# Patient Record
Sex: FEMALE | Race: WHITE | NOT HISPANIC OR LATINO
[De-identification: names, ages, dates, MRNs, and addresses within clinical notes are randomized per-mention and may not be internally consistent; named-entity substitution may affect disease eponyms.]

---

## 2017-03-21 ENCOUNTER — APPOINTMENT (OUTPATIENT)
Dept: GASTROENTEROLOGY | Facility: CLINIC | Age: 67
End: 2017-03-21

## 2017-11-13 ENCOUNTER — OUTPATIENT (OUTPATIENT)
Dept: OUTPATIENT SERVICES | Facility: HOSPITAL | Age: 67
LOS: 1 days | End: 2017-11-13
Payer: MEDICARE

## 2017-11-13 VITALS
OXYGEN SATURATION: 99 % | HEART RATE: 80 BPM | SYSTOLIC BLOOD PRESSURE: 130 MMHG | HEIGHT: 60 IN | DIASTOLIC BLOOD PRESSURE: 74 MMHG | TEMPERATURE: 97 F | RESPIRATION RATE: 16 BRPM | WEIGHT: 197.98 LBS

## 2017-11-13 DIAGNOSIS — I10 ESSENTIAL (PRIMARY) HYPERTENSION: ICD-10-CM

## 2017-11-13 DIAGNOSIS — D64.9 ANEMIA, UNSPECIFIED: ICD-10-CM

## 2017-11-13 DIAGNOSIS — Z98.890 OTHER SPECIFIED POSTPROCEDURAL STATES: Chronic | ICD-10-CM

## 2017-11-13 DIAGNOSIS — Z85.820 PERSONAL HISTORY OF MALIGNANT MELANOMA OF SKIN: Chronic | ICD-10-CM

## 2017-11-13 DIAGNOSIS — N81.10 CYSTOCELE, UNSPECIFIED: Chronic | ICD-10-CM

## 2017-11-13 DIAGNOSIS — Z96.653 PRESENCE OF ARTIFICIAL KNEE JOINT, BILATERAL: Chronic | ICD-10-CM

## 2017-11-13 DIAGNOSIS — E78.00 PURE HYPERCHOLESTEROLEMIA, UNSPECIFIED: ICD-10-CM

## 2017-11-13 LAB
BUN SERPL-MCNC: 27 MG/DL — HIGH (ref 7–23)
CALCIUM SERPL-MCNC: 9.6 MG/DL — SIGNIFICANT CHANGE UP (ref 8.4–10.5)
CHLORIDE SERPL-SCNC: 101 MMOL/L — SIGNIFICANT CHANGE UP (ref 98–107)
CO2 SERPL-SCNC: 27 MMOL/L — SIGNIFICANT CHANGE UP (ref 22–31)
CREAT SERPL-MCNC: 0.94 MG/DL — SIGNIFICANT CHANGE UP (ref 0.5–1.3)
GLUCOSE SERPL-MCNC: 88 MG/DL — SIGNIFICANT CHANGE UP (ref 70–99)
HCT VFR BLD CALC: 33.7 % — LOW (ref 34.5–45)
HGB BLD-MCNC: 11.2 G/DL — LOW (ref 11.5–15.5)
MCHC RBC-ENTMCNC: 31.6 PG — SIGNIFICANT CHANGE UP (ref 27–34)
MCHC RBC-ENTMCNC: 33.2 % — SIGNIFICANT CHANGE UP (ref 32–36)
MCV RBC AUTO: 95.2 FL — SIGNIFICANT CHANGE UP (ref 80–100)
NRBC # FLD: 0 — SIGNIFICANT CHANGE UP
PLATELET # BLD AUTO: 245 K/UL — SIGNIFICANT CHANGE UP (ref 150–400)
PMV BLD: 11.1 FL — SIGNIFICANT CHANGE UP (ref 7–13)
POTASSIUM SERPL-MCNC: 4.1 MMOL/L — SIGNIFICANT CHANGE UP (ref 3.5–5.3)
POTASSIUM SERPL-SCNC: 4.1 MMOL/L — SIGNIFICANT CHANGE UP (ref 3.5–5.3)
RBC # BLD: 3.54 M/UL — LOW (ref 3.8–5.2)
RBC # FLD: 12.6 % — SIGNIFICANT CHANGE UP (ref 10.3–14.5)
SODIUM SERPL-SCNC: 141 MMOL/L — SIGNIFICANT CHANGE UP (ref 135–145)
WBC # BLD: 8.37 K/UL — SIGNIFICANT CHANGE UP (ref 3.8–10.5)
WBC # FLD AUTO: 8.37 K/UL — SIGNIFICANT CHANGE UP (ref 3.8–10.5)

## 2017-11-13 PROCEDURE — 93010 ELECTROCARDIOGRAM REPORT: CPT

## 2017-11-13 NOTE — H&P PST ADULT - FAMILY HISTORY
Father  Still living? No  Family history of CHF (congestive heart failure), Age at diagnosis: Age Unknown  Family history of renal disease, Age at diagnosis: Age Unknown     Mother  Still living? Yes, Estimated age:   Family history of rheumatic fever, Age at diagnosis: Age Unknown

## 2017-11-13 NOTE — H&P PST ADULT - PROBLEM SELECTOR PLAN 1
Pt. is scheduled for upper endoscopy, colonoscopy on 11/21/17.  Preoperative instructions reviewed, pt verbalized understanding.    Preop Famotidine provided.  Lab results pending, EKG done

## 2017-11-13 NOTE — H&P PST ADULT - NSANTHOSAYNRD_GEN_A_CORE
No. QUIN screening performed.  STOP BANG Legend: 0-2 = LOW Risk; 3-4 = INTERMEDIATE Risk; 5-8 = HIGH Risk

## 2017-11-13 NOTE — H&P PST ADULT - HISTORY OF PRESENT ILLNESS
[age] y/o female presents to PAST today for presurgical evaluation.  Her last colonoscopy was approximately 5 years ago.  Recent blood work revealed that she is anemic.  She denies any abdominal pain, diarrhea, constipation or change in bowel habits.  She is scheduled for upper endoscopy, colonoscopy on 11/21/17.

## 2017-11-13 NOTE — H&P PST ADULT - PSH
Female bladder prolapse  s/p sling (2014)  H/O total knee replacement, bilateral  5/2013  History of malignant melanoma  left leg, s/p excision (1978)  S/P bunionectomy  right

## 2017-11-21 ENCOUNTER — RESULT REVIEW (OUTPATIENT)
Age: 67
End: 2017-11-21

## 2017-11-21 ENCOUNTER — TRANSCRIPTION ENCOUNTER (OUTPATIENT)
Age: 67
End: 2017-11-21

## 2017-11-21 ENCOUNTER — OUTPATIENT (OUTPATIENT)
Dept: OUTPATIENT SERVICES | Facility: HOSPITAL | Age: 67
LOS: 1 days | Discharge: HOME | End: 2017-11-21
Payer: MEDICARE

## 2017-11-21 DIAGNOSIS — Z98.890 OTHER SPECIFIED POSTPROCEDURAL STATES: Chronic | ICD-10-CM

## 2017-11-21 DIAGNOSIS — Z96.653 PRESENCE OF ARTIFICIAL KNEE JOINT, BILATERAL: Chronic | ICD-10-CM

## 2017-11-21 DIAGNOSIS — Z85.820 PERSONAL HISTORY OF MALIGNANT MELANOMA OF SKIN: Chronic | ICD-10-CM

## 2017-11-21 DIAGNOSIS — D64.9 ANEMIA, UNSPECIFIED: ICD-10-CM

## 2017-11-21 DIAGNOSIS — N81.10 CYSTOCELE, UNSPECIFIED: Chronic | ICD-10-CM

## 2017-11-21 PROCEDURE — 88305 TISSUE EXAM BY PATHOLOGIST: CPT | Mod: 26

## 2017-11-21 PROCEDURE — 88312 SPECIAL STAINS GROUP 1: CPT | Mod: 26

## 2017-11-24 LAB — SURGICAL PATHOLOGY STUDY: SIGNIFICANT CHANGE UP

## 2018-10-31 PROBLEM — D64.9 ANEMIA, UNSPECIFIED: Chronic | Status: ACTIVE | Noted: 2017-11-13

## 2018-10-31 PROBLEM — E03.9 HYPOTHYROIDISM, UNSPECIFIED: Chronic | Status: ACTIVE | Noted: 2017-11-13

## 2018-10-31 PROBLEM — E78.00 PURE HYPERCHOLESTEROLEMIA, UNSPECIFIED: Chronic | Status: ACTIVE | Noted: 2017-11-13

## 2018-10-31 PROBLEM — C43.70 MALIGNANT MELANOMA OF UNSPECIFIED LOWER LIMB, INCLUDING HIP: Chronic | Status: ACTIVE | Noted: 2017-11-13

## 2018-10-31 PROBLEM — I10 ESSENTIAL (PRIMARY) HYPERTENSION: Chronic | Status: ACTIVE | Noted: 2017-11-13

## 2018-11-27 ENCOUNTER — RX RENEWAL (OUTPATIENT)
Age: 68
End: 2018-11-27

## 2019-01-03 ENCOUNTER — APPOINTMENT (OUTPATIENT)
Dept: INTERNAL MEDICINE | Facility: CLINIC | Age: 69
End: 2019-01-03
Payer: MEDICARE

## 2019-01-03 VITALS
DIASTOLIC BLOOD PRESSURE: 86 MMHG | RESPIRATION RATE: 16 BRPM | HEART RATE: 96 BPM | WEIGHT: 190 LBS | SYSTOLIC BLOOD PRESSURE: 133 MMHG | HEIGHT: 61 IN | BODY MASS INDEX: 35.87 KG/M2

## 2019-01-03 DIAGNOSIS — Z00.00 ENCOUNTER FOR GENERAL ADULT MEDICAL EXAMINATION W/OUT ABNORMAL FINDINGS: ICD-10-CM

## 2019-01-03 PROCEDURE — 99214 OFFICE O/P EST MOD 30 MIN: CPT

## 2019-01-03 RX ORDER — HYDROCODONE BITARTRATE AND HOMATROPINE METHYLBROMIDE 5; 1.5 MG/5ML; MG/5ML
5-1.5 SOLUTION ORAL EVERY 4 HOURS
Refills: 0 | Status: DISCONTINUED | COMMUNITY
End: 2019-01-03

## 2019-01-03 NOTE — HISTORY OF PRESENT ILLNESS
[Patient was last seen on ___] : Patient was last seen on [unfilled] [FreeTextEntry6] : Existing patient\par Scheduled appointment\par Chronic problems\par Continuation of care\par Management of issues\par Coordination of therapies\par \par The patient has been having pain in her ankles especially the right.  She has seen foot and ankle surgeon Prabhakar Rivas and is anticipating reconstruction surgery to be done as an outpatient at [place] in early March.\par \par She continues to have pain in her low back and in her right hip which limits her mobility and functionality.\par \par Presurgical testing will be done at [place] and she will be seen here for clearance several days later. [Checks BP Regularly] : The patient checks ~his/her~ blood pressure regularly [de-identified] : Controlled\par On meds\par Adherent\par No side effects [Doing Well] : Patient is doing well [Moderate Intensity Therapy] : Patient is currently on moderate intensity statin  therapy

## 2019-01-03 NOTE — PHYSICAL EXAM

## 2019-01-08 ENCOUNTER — APPOINTMENT (OUTPATIENT)
Dept: INTERNAL MEDICINE | Facility: CLINIC | Age: 69
End: 2019-01-08
Payer: MEDICARE

## 2019-01-08 PROCEDURE — 93306 TTE W/DOPPLER COMPLETE: CPT

## 2019-03-03 ENCOUNTER — TRANSCRIPTION ENCOUNTER (OUTPATIENT)
Age: 69
End: 2019-03-03

## 2019-03-06 ENCOUNTER — APPOINTMENT (OUTPATIENT)
Dept: INTERNAL MEDICINE | Facility: CLINIC | Age: 69
End: 2019-03-06
Payer: MEDICARE

## 2019-03-06 VITALS — HEART RATE: 86 BPM | DIASTOLIC BLOOD PRESSURE: 82 MMHG | SYSTOLIC BLOOD PRESSURE: 125 MMHG

## 2019-03-06 VITALS — WEIGHT: 190 LBS | HEIGHT: 61 IN | BODY MASS INDEX: 35.87 KG/M2

## 2019-03-06 DIAGNOSIS — M54.16 RADICULOPATHY, LUMBAR REGION: ICD-10-CM

## 2019-03-06 DIAGNOSIS — I65.29 OCCLUSION AND STENOSIS OF UNSPECIFIED CAROTID ARTERY: ICD-10-CM

## 2019-03-06 PROCEDURE — 99214 OFFICE O/P EST MOD 30 MIN: CPT

## 2019-03-06 NOTE — RESULTS/DATA
[] : results reviewed [de-identified] : acceptable [de-identified] : acceptable [de-identified] : acceptable [de-identified] : acceptable

## 2019-03-06 NOTE — HISTORY OF PRESENT ILLNESS
[No Pertinent Cardiac History] : no history of aortic stenosis, atrial fibrillation, coronary artery disease, recent myocardial infarction, or implantable device/pacemaker [No Pertinent Pulmonary History] : no history of asthma, COPD, sleep apnea, or smoking [No Adverse Anesthesia Reaction] : no adverse anesthesia reaction in self or family member [(Patient denies any chest pain, claudication, dyspnea on exertion, orthopnea, palpitations or syncope)] : Patient denies any chest pain, claudication, dyspnea on exertion, orthopnea, palpitations or syncope [Chronic Anticoagulation] : no chronic anticoagulation [Chronic Kidney Disease] : no chronic kidney disease [FreeTextEntry1] : triple arthrodesis R foot [FreeTextEntry2] : Tuesday March 12, 2019 [FreeTextEntry3] : Prabhakar Rivas MD   to be done ambulatory at [facility] in [place], [place] [FreeTextEntry7] : EKG  pre op  wnl\par Echo  Jan '19  unremarkable

## 2019-03-06 NOTE — CONSULT LETTER
[Dear  ___] : Dear  [unfilled], [Consult Closing:] : Thank you very much for allowing me to participate in the care of this patient.  If you have any questions, please do not hesitate to contact me. [FreeTextEntry2] : Prabhakar Riavs MD\par [address] [facility]\par [place]

## 2019-03-06 NOTE — ASSESSMENT
[Patient Optimized for Surgery] : Patient optimized for surgery [No Further Testing Recommended] : no further testing recommended [Continue medications as is] : Continue current medications [As per surgery] : as per surgery

## 2019-05-29 ENCOUNTER — RX RENEWAL (OUTPATIENT)
Age: 69
End: 2019-05-29

## 2019-07-18 ENCOUNTER — APPOINTMENT (OUTPATIENT)
Dept: INTERNAL MEDICINE | Facility: CLINIC | Age: 69
End: 2019-07-18
Payer: MEDICARE

## 2019-07-18 VITALS
OXYGEN SATURATION: 98 % | WEIGHT: 190 LBS | RESPIRATION RATE: 16 BRPM | DIASTOLIC BLOOD PRESSURE: 75 MMHG | HEART RATE: 83 BPM | BODY MASS INDEX: 35.87 KG/M2 | HEIGHT: 61 IN | SYSTOLIC BLOOD PRESSURE: 128 MMHG

## 2019-07-18 DIAGNOSIS — R79.89 OTHER SPECIFIED ABNORMAL FINDINGS OF BLOOD CHEMISTRY: ICD-10-CM

## 2019-07-18 DIAGNOSIS — D64.9 ANEMIA, UNSPECIFIED: ICD-10-CM

## 2019-07-18 DIAGNOSIS — H61.301 ACQUIRED STENOSIS OF RIGHT EXTERNAL EAR CANAL, UNSPECIFIED: ICD-10-CM

## 2019-07-18 PROCEDURE — 36415 COLL VENOUS BLD VENIPUNCTURE: CPT

## 2019-07-18 PROCEDURE — 99214 OFFICE O/P EST MOD 30 MIN: CPT | Mod: 25

## 2019-07-18 RX ORDER — MELOXICAM 15 MG/1
15 TABLET ORAL DAILY
Qty: 90 | Refills: 0 | Status: ACTIVE | COMMUNITY
Start: 2019-07-18

## 2019-07-18 NOTE — HISTORY OF PRESENT ILLNESS
[FreeTextEntry1] : This is a followup visit.\par \par 3 months ago.  She had a triple arthrodesis in the right foot done at [facility] without an overnight stay.  She was nonweightbearing for 3 months, but now has returned to walking, better than what he can and bearing weight and was out pain, limitation on anti-inflammatory with meloxicam on a daily basis.\par \par She has been adhering to her other medication.\par \par She has no side effects from the medication that she is taking.\par She has been getting physical therapy regularly.\par She has returned to nearly all her activities of daily living.

## 2019-07-19 LAB
ALBUMIN SERPL ELPH-MCNC: 4.7 G/DL
ALP BLD-CCNC: 98 U/L
ALT SERPL-CCNC: 28 U/L
ANION GAP SERPL CALC-SCNC: 11 MMOL/L
AST SERPL-CCNC: 31 U/L
BASOPHILS # BLD AUTO: 0.04 K/UL
BASOPHILS NFR BLD AUTO: 0.6 %
BILIRUB SERPL-MCNC: 0.4 MG/DL
BUN SERPL-MCNC: 32 MG/DL
CALCIUM SERPL-MCNC: 9.5 MG/DL
CHLORIDE SERPL-SCNC: 100 MMOL/L
CHOLEST SERPL-MCNC: 203 MG/DL
CHOLEST/HDLC SERPL: 3.3 RATIO
CK SERPL-CCNC: 288 U/L
CO2 SERPL-SCNC: 27 MMOL/L
CREAT SERPL-MCNC: 0.95 MG/DL
EOSINOPHIL # BLD AUTO: 0.21 K/UL
EOSINOPHIL NFR BLD AUTO: 3.1 %
ERYTHROCYTE [SEDIMENTATION RATE] IN BLOOD BY WESTERGREN METHOD: 9 MM/HR
GGT SERPL-CCNC: 16 U/L
GLUCOSE SERPL-MCNC: 100 MG/DL
HCT VFR BLD CALC: 36.9 %
HDLC SERPL-MCNC: 62 MG/DL
HGB BLD-MCNC: 12.1 G/DL
IMM GRANULOCYTES NFR BLD AUTO: 0.3 %
LDLC SERPL CALC-MCNC: 110 MG/DL
LYMPHOCYTES # BLD AUTO: 1.48 K/UL
LYMPHOCYTES NFR BLD AUTO: 21.9 %
MAN DIFF?: NORMAL
MCHC RBC-ENTMCNC: 30.9 PG
MCHC RBC-ENTMCNC: 32.8 GM/DL
MCV RBC AUTO: 94.4 FL
MONOCYTES # BLD AUTO: 0.62 K/UL
MONOCYTES NFR BLD AUTO: 9.2 %
NEUTROPHILS # BLD AUTO: 4.39 K/UL
NEUTROPHILS NFR BLD AUTO: 64.9 %
PLATELET # BLD AUTO: 224 K/UL
POTASSIUM SERPL-SCNC: 4.8 MMOL/L
PROT SERPL-MCNC: 7.3 G/DL
RBC # BLD: 3.91 M/UL
RBC # FLD: 13.7 %
SODIUM SERPL-SCNC: 138 MMOL/L
T4 FREE SERPL-MCNC: 1.8 NG/DL
TRIGL SERPL-MCNC: 157 MG/DL
TSH SERPL-ACNC: 0.08 UIU/ML
WBC # FLD AUTO: 6.76 K/UL

## 2019-09-01 ENCOUNTER — RX RENEWAL (OUTPATIENT)
Age: 69
End: 2019-09-01

## 2019-09-24 ENCOUNTER — APPOINTMENT (OUTPATIENT)
Dept: INTERNAL MEDICINE | Facility: CLINIC | Age: 69
End: 2019-09-24
Payer: MEDICARE

## 2019-09-24 VITALS
SYSTOLIC BLOOD PRESSURE: 128 MMHG | HEART RATE: 80 BPM | BODY MASS INDEX: 35.87 KG/M2 | WEIGHT: 190 LBS | DIASTOLIC BLOOD PRESSURE: 75 MMHG | HEIGHT: 61 IN

## 2019-09-24 DIAGNOSIS — M25.559 PAIN IN UNSPECIFIED HIP: ICD-10-CM

## 2019-09-24 DIAGNOSIS — Z87.891 PERSONAL HISTORY OF NICOTINE DEPENDENCE: ICD-10-CM

## 2019-09-24 DIAGNOSIS — Z86.19 PERSONAL HISTORY OF OTHER INFECTIOUS AND PARASITIC DISEASES: ICD-10-CM

## 2019-09-24 DIAGNOSIS — Z85.820 PERSONAL HISTORY OF MALIGNANT MELANOMA OF SKIN: ICD-10-CM

## 2019-09-24 DIAGNOSIS — Z87.898 PERSONAL HISTORY OF OTHER SPECIFIED CONDITIONS: ICD-10-CM

## 2019-09-24 DIAGNOSIS — M48.061 SPINAL STENOSIS, LUMBAR REGION WITHOUT NEUROGENIC CLAUDICATION: ICD-10-CM

## 2019-09-24 DIAGNOSIS — J06.9 ACUTE UPPER RESPIRATORY INFECTION, UNSPECIFIED: ICD-10-CM

## 2019-09-24 DIAGNOSIS — Z78.9 OTHER SPECIFIED HEALTH STATUS: ICD-10-CM

## 2019-09-24 PROCEDURE — 99214 OFFICE O/P EST MOD 30 MIN: CPT | Mod: 25

## 2019-09-24 PROCEDURE — 90662 IIV NO PRSV INCREASED AG IM: CPT

## 2019-09-24 PROCEDURE — G0008: CPT

## 2019-09-24 NOTE — HISTORY OF PRESENT ILLNESS
[No Pertinent Cardiac History] : no history of aortic stenosis, atrial fibrillation, coronary artery disease, recent myocardial infarction, or implantable device/pacemaker [No Pertinent Pulmonary History] : no history of asthma, COPD, sleep apnea, or smoking [No Adverse Anesthesia Reaction] : no adverse anesthesia reaction in self or family member [(Patient denies any chest pain, claudication, dyspnea on exertion, orthopnea, palpitations or syncope)] : Patient denies any chest pain, claudication, dyspnea on exertion, orthopnea, palpitations or syncope [Aortic Stenosis] : no aortic stenosis [Atrial Fibrillation] : no atrial fibrillation [Recent Myocardial Infarction] : no recent myocardial infarction [Coronary Artery Disease] : no coronary artery disease [Implantable Device/Pacemaker] : no implantable device/pacemaker [Asthma] : no asthma [COPD] : no COPD [Sleep Apnea] : no sleep apnea [Smoker] : not a smoker [Family Member] : no family member with adverse anesthesia reaction/sudden death [Self] : no previous adverse anesthesia reaction [Diabetes] : no diabetes [Chronic Anticoagulation] : no chronic anticoagulation [Chronic Kidney Disease] : no chronic kidney disease [FreeTextEntry1] : triple arthrodesis left ankle and bunionectomy left foot [FreeTextEntry2] : Tuesday October 1, 2019 to be done ambulatory at [facility] in [place], [place] [FreeTextEntry3] : Prabhakar Rivas MD [FreeTextEntry4] : Had R ankle / door surgery in March 2019 and did well

## 2019-09-24 NOTE — ASSESSMENT
[No Further Testing Recommended] : no further testing recommended [Patient Optimized for Surgery] : Patient optimized for surgery [Continue medications as is] : Continue current medications [As per surgery] : as per surgery

## 2019-09-24 NOTE — RESULTS/DATA
[] : results reviewed [de-identified] : acceptable [de-identified] : acceptable [de-identified] : acceptable [de-identified] : acceptable

## 2019-09-24 NOTE — PHYSICAL EXAM
[No Acute Distress] : no acute distress [Well Nourished] : well nourished [Well Developed] : well developed [Well-Appearing] : well-appearing [PERRL] : pupils equal round and reactive to light [Normal Sclera/Conjunctiva] : normal sclera/conjunctiva [EOMI] : extraocular movements intact [Normal Outer Ear/Nose] : the outer ears and nose were normal in appearance [No JVD] : no jugular venous distention [Normal Oropharynx] : the oropharynx was normal [No Lymphadenopathy] : no lymphadenopathy [Supple] : supple [No Respiratory Distress] : no respiratory distress  [No Accessory Muscle Use] : no accessory muscle use [Thyroid Normal, No Nodules] : the thyroid was normal and there were no nodules present [Clear to Auscultation] : lungs were clear to auscultation bilaterally [Normal Rate] : normal rate  [Regular Rhythm] : with a regular rhythm [Normal S1, S2] : normal S1 and S2 [No Carotid Bruits] : no carotid bruits [No Murmur] : no murmur heard [No Abdominal Bruit] : a ~M bruit was not heard ~T in the abdomen [No Varicosities] : no varicosities [No Edema] : there was no peripheral edema [Pedal Pulses Present] : the pedal pulses are present [No Extremity Clubbing/Cyanosis] : no extremity clubbing/cyanosis [No Palpable Aorta] : no palpable aorta [Soft] : abdomen soft [Non Tender] : non-tender [Non-distended] : non-distended [No Masses] : no abdominal mass palpated [No HSM] : no HSM [Normal Bowel Sounds] : normal bowel sounds [Normal Posterior Cervical Nodes] : no posterior cervical lymphadenopathy [Normal Anterior Cervical Nodes] : no anterior cervical lymphadenopathy [No CVA Tenderness] : no CVA  tenderness [No Spinal Tenderness] : no spinal tenderness [No Joint Swelling] : no joint swelling [Grossly Normal Strength/Tone] : grossly normal strength/tone [No Rash] : no rash [Coordination Grossly Intact] : coordination grossly intact [No Focal Deficits] : no focal deficits [Normal Gait] : normal gait [Deep Tendon Reflexes (DTR)] : deep tendon reflexes were 2+ and symmetric [Normal Insight/Judgement] : insight and judgment were intact [Normal Affect] : the affect was normal

## 2019-09-24 NOTE — CONSULT LETTER
[Dear  ___] : Dear  [unfilled], [Please see my note below.] : Please see my note below. [Consult Letter:] : I had the pleasure of evaluating your patient, [unfilled]. [Consult Closing:] : Thank you very much for allowing me to participate in the care of this patient.  If you have any questions, please do not hesitate to contact me. [FreeTextEntry2] : Prabhakar Rivas MD\par [address] [facility]\par [place], [place]

## 2019-09-27 ENCOUNTER — RX RENEWAL (OUTPATIENT)
Age: 69
End: 2019-09-27

## 2019-09-27 RX ORDER — ROSUVASTATIN CALCIUM 20 MG/1
20 TABLET, FILM COATED ORAL DAILY
Qty: 90 | Refills: 3 | Status: ACTIVE | COMMUNITY
Start: 2019-09-27 | End: 1900-01-01

## 2019-10-07 ENCOUNTER — APPOINTMENT (OUTPATIENT)
Dept: INTERNAL MEDICINE | Facility: CLINIC | Age: 69
End: 2019-10-07

## 2019-10-14 ENCOUNTER — APPOINTMENT (OUTPATIENT)
Dept: INTERNAL MEDICINE | Facility: CLINIC | Age: 69
End: 2019-10-14

## 2019-12-23 ENCOUNTER — RX RENEWAL (OUTPATIENT)
Age: 69
End: 2019-12-23

## 2019-12-23 RX ORDER — IRBESARTAN AND HYDROCHLOROTHIAZIDE 150; 12.5 MG/1; MG/1
150-12.5 TABLET ORAL DAILY
Qty: 90 | Refills: 3 | Status: ACTIVE | COMMUNITY
Start: 2019-12-23 | End: 1900-01-01

## 2020-01-06 ENCOUNTER — RECORD ABSTRACTING (OUTPATIENT)
Age: 70
End: 2020-01-06

## 2020-01-06 DIAGNOSIS — Z92.89 PERSONAL HISTORY OF OTHER MEDICAL TREATMENT: ICD-10-CM

## 2020-01-07 ENCOUNTER — APPOINTMENT (OUTPATIENT)
Dept: INTERNAL MEDICINE | Facility: CLINIC | Age: 70
End: 2020-01-07
Payer: MEDICARE

## 2020-01-07 VITALS
DIASTOLIC BLOOD PRESSURE: 83 MMHG | SYSTOLIC BLOOD PRESSURE: 126 MMHG | OXYGEN SATURATION: 98 % | WEIGHT: 206 LBS | HEART RATE: 72 BPM | BODY MASS INDEX: 38.89 KG/M2 | RESPIRATION RATE: 16 BRPM | HEIGHT: 61 IN

## 2020-01-07 DIAGNOSIS — E03.9 HYPOTHYROIDISM, UNSPECIFIED: ICD-10-CM

## 2020-01-07 DIAGNOSIS — I10 ESSENTIAL (PRIMARY) HYPERTENSION: ICD-10-CM

## 2020-01-07 DIAGNOSIS — E78.00 PURE HYPERCHOLESTEROLEMIA, UNSPECIFIED: ICD-10-CM

## 2020-01-07 PROCEDURE — 99213 OFFICE O/P EST LOW 20 MIN: CPT

## 2020-01-07 RX ORDER — HYDROCODONE BITARTRATE AND HOMATROPINE METHYLBROMIDE 5; 1.5 MG/5ML; MG/5ML
5-1.5 SOLUTION ORAL
Refills: 0 | Status: DISCONTINUED | COMMUNITY
End: 2020-01-07

## 2020-01-07 NOTE — PHYSICAL EXAM
[No Acute Distress] : no acute distress [Well-Appearing] : well-appearing [Well Developed] : well developed [Well Nourished] : well nourished [Normal Sclera/Conjunctiva] : normal sclera/conjunctiva [PERRL] : pupils equal round and reactive to light [EOMI] : extraocular movements intact [Normal Outer Ear/Nose] : the outer ears and nose were normal in appearance [No JVD] : no jugular venous distention [Normal Oropharynx] : the oropharynx was normal [Supple] : supple [No Lymphadenopathy] : no lymphadenopathy [Thyroid Normal, No Nodules] : the thyroid was normal and there were no nodules present [No Accessory Muscle Use] : no accessory muscle use [No Respiratory Distress] : no respiratory distress  [Normal Rate] : normal rate  [Clear to Auscultation] : lungs were clear to auscultation bilaterally [Normal S1, S2] : normal S1 and S2 [Regular Rhythm] : with a regular rhythm [No Murmur] : no murmur heard [No Carotid Bruits] : no carotid bruits [No Abdominal Bruit] : a ~M bruit was not heard ~T in the abdomen [Pedal Pulses Present] : the pedal pulses are present [No Varicosities] : no varicosities [No Edema] : there was no peripheral edema [No Palpable Aorta] : no palpable aorta [Non Tender] : non-tender [Soft] : abdomen soft [No Extremity Clubbing/Cyanosis] : no extremity clubbing/cyanosis [Non-distended] : non-distended [No Masses] : no abdominal mass palpated [No HSM] : no HSM [Normal Bowel Sounds] : normal bowel sounds [Normal Posterior Cervical Nodes] : no posterior cervical lymphadenopathy [Normal Anterior Cervical Nodes] : no anterior cervical lymphadenopathy [No CVA Tenderness] : no CVA  tenderness [No Spinal Tenderness] : no spinal tenderness [No Joint Swelling] : no joint swelling [Grossly Normal Strength/Tone] : grossly normal strength/tone [No Rash] : no rash [Coordination Grossly Intact] : coordination grossly intact [No Focal Deficits] : no focal deficits [Normal Gait] : normal gait [Deep Tendon Reflexes (DTR)] : deep tendon reflexes were 2+ and symmetric [Normal Insight/Judgement] : insight and judgment were intact [Normal Affect] : the affect was normal

## 2020-01-07 NOTE — HISTORY OF PRESENT ILLNESS
[FreeTextEntry1] : Surg done 3 m ago\par [facility]\par [facility]\par Triple arthrodesis / bunionectomy\par Healing\par To start PT when open wound closes\par \par Modest pain\par Can function pretty well\par \par \par To see Arin in F/U  ("leakage")  [de-identified] : Adherent to meds\par No apparent side effects\par

## 2020-03-24 ENCOUNTER — APPOINTMENT (OUTPATIENT)
Dept: INTERNAL MEDICINE | Facility: CLINIC | Age: 70
End: 2020-03-24

## 2020-06-05 ENCOUNTER — RX RENEWAL (OUTPATIENT)
Age: 70
End: 2020-06-05

## 2020-11-02 RX ORDER — FLUTICASONE PROPIONATE 50 UG/1
50 SPRAY, METERED NASAL
Qty: 16 | Refills: 3 | Status: ACTIVE | COMMUNITY
Start: 2019-05-29 | End: 1900-01-01

## 2020-11-30 ENCOUNTER — RX RENEWAL (OUTPATIENT)
Age: 70
End: 2020-11-30

## 2022-06-01 ENCOUNTER — APPOINTMENT (OUTPATIENT)
Dept: ORTHOPEDIC SURGERY | Facility: CLINIC | Age: 72
End: 2022-06-01
Payer: MEDICARE

## 2022-06-01 VITALS — WEIGHT: 180 LBS | HEIGHT: 61 IN | BODY MASS INDEX: 33.99 KG/M2

## 2022-06-01 DIAGNOSIS — M16.11 UNILATERAL PRIMARY OSTEOARTHRITIS, RIGHT HIP: ICD-10-CM

## 2022-06-01 PROCEDURE — 73502 X-RAY EXAM HIP UNI 2-3 VIEWS: CPT

## 2022-06-01 PROCEDURE — 72170 X-RAY EXAM OF PELVIS: CPT | Mod: RT,59

## 2022-06-01 PROCEDURE — 99204 OFFICE O/P NEW MOD 45 MIN: CPT

## 2022-06-01 NOTE — PHYSICAL EXAM
[de-identified] : Constitutional:Well nourished , well developed and in no acute distress\par Psychiatric: Alert and oriented to time place and person.Appropriate affect \par Skin:Head, neck, arms and lower extremities:no lesions or discoloration\par HEENT: Normocephalic, EOM intact, Nasal septum midline,\par Respiratory: Unlabored respirations,no audible wheezing ,no tachypnea, no cyanosis\par Cardiovascular: no leg swelling  no ankle edema no JVD, pulse regular\par Vascular: no calf or thigh tenderness, \par Peripheral pulses; intact\par Lymphatics:No groin adenopathy,no lymphedema lower  or upper extremities\par Right hip logroll provokes typical moderate to marked right hip pain neurovascular intact [de-identified] : X-ray AP pelvis right hip AP lateral reveals narrowing Right superolateral joint space With bone-on-bone contact.  There is A large subchondral acetabular cyst

## 2022-06-01 NOTE — HISTORY OF PRESENT ILLNESS
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  Patient complains of spontaneous onset of chronic pain right hip pain in buttock radiating into the thigh and knee.  Has become constant aggravated by ambulation.  Patient has not responded to acupuncture treatment.  We experience aggravation of symptoms following physical therapy.  Has undergone steroid injection right hip and April 3, 2022 without symptom improvement.

## 2022-06-10 ENCOUNTER — APPOINTMENT (OUTPATIENT)
Dept: ORTHOPEDIC SURGERY | Facility: CLINIC | Age: 72
End: 2022-06-10

## 2022-06-21 ENCOUNTER — APPOINTMENT (OUTPATIENT)
Dept: ORTHOPEDIC SURGERY | Facility: CLINIC | Age: 72
End: 2022-06-21

## 2023-05-15 NOTE — H&P PST ADULT - DOES PATIENT MEET CRITERIA FOR SEPSIS
Acitretin Counseling:  I discussed with the patient the risks of acitretin including but not limited to hair loss, dry lips/skin/eyes, liver damage, hyperlipidemia, depression/suicidal ideation, photosensitivity.  Serious rare side effects can include but are not limited to pancreatitis, pseudotumor cerebri, bony changes, clot formation/stroke/heart attack.  Patient understands that alcohol is contraindicated since it can result in liver toxicity and significantly prolong the elimination of the drug by many years. No

## 2024-05-15 NOTE — HISTORY OF PRESENT ILLNESS
[FreeTextEntry1] : YOBANI is a 73 year female who presents for  She was referred by         Daytime frequency: Nocturia: Urinary urgency: Leakage of urine with urgency: Leakage of urine with coughing sneezing laughing: Incontinence pad use: Sensation of incomplete bladder emptying: History of frequent urinary tract infections: History of hematuria: Previous treatment: Vaginal symptoms: Bowel symptoms:         OB: GYN: LMP, pap, estrogen use PMH: PSH: Meds: Allx:

## 2024-05-15 NOTE — ADDENDUM
[FreeTextEntry1] : This note was written by Amalia Mcwilliams, acting as the  for Dr. Villa. This note accurately reflects the work and decisions made by Dr. Villa.

## 2024-05-15 NOTE — DISCUSSION/SUMMARY
[FreeTextEntry1] : YOBANI is a 73 year female who presents for On exam, negative CST, normal PVR, no POP.   [] []   f/u All questions answered.

## 2024-05-21 ENCOUNTER — APPOINTMENT (OUTPATIENT)
Dept: UROGYNECOLOGY | Facility: CLINIC | Age: 74
End: 2024-05-21

## 2024-07-22 ENCOUNTER — APPOINTMENT (OUTPATIENT)
Dept: UROLOGY | Facility: CLINIC | Age: 74
End: 2024-07-22
Payer: MEDICARE

## 2024-07-22 VITALS
RESPIRATION RATE: 17 BRPM | DIASTOLIC BLOOD PRESSURE: 86 MMHG | HEART RATE: 78 BPM | BODY MASS INDEX: 33.99 KG/M2 | HEIGHT: 61 IN | SYSTOLIC BLOOD PRESSURE: 142 MMHG | WEIGHT: 180 LBS

## 2024-07-22 DIAGNOSIS — R33.9 RETENTION OF URINE, UNSPECIFIED: ICD-10-CM

## 2024-07-22 DIAGNOSIS — N81.10 CYSTOCELE, UNSPECIFIED: ICD-10-CM

## 2024-07-22 DIAGNOSIS — N39.3 STRESS INCONTINENCE (FEMALE) (MALE): ICD-10-CM

## 2024-07-22 DIAGNOSIS — Z84.1 FAMILY HISTORY OF DISORDERS OF KIDNEY AND URETER: ICD-10-CM

## 2024-07-22 DIAGNOSIS — N39.41 URGE INCONTINENCE: ICD-10-CM

## 2024-07-22 DIAGNOSIS — N32.3 DIVERTICULUM OF BLADDER: ICD-10-CM

## 2024-07-22 DIAGNOSIS — N39.490 STRESS INCONTINENCE (FEMALE) (MALE): ICD-10-CM

## 2024-07-22 DIAGNOSIS — Z63.4 DISAPPEARANCE AND DEATH OF FAMILY MEMBER: ICD-10-CM

## 2024-07-22 PROCEDURE — 99204 OFFICE O/P NEW MOD 45 MIN: CPT

## 2024-07-22 RX ORDER — LEVOTHYROXINE SODIUM 137 UG/1
TABLET ORAL
Refills: 0 | Status: ACTIVE | COMMUNITY

## 2024-07-22 SDOH — SOCIAL STABILITY - SOCIAL INSECURITY: DISSAPEARANCE AND DEATH OF FAMILY MEMBER: Z63.4

## 2024-07-22 NOTE — PHYSICAL EXAM
[Normal Appearance] : normal appearance [Well Groomed] : well groomed [General Appearance - In No Acute Distress] : no acute distress [Edema] : no peripheral edema [] : no respiratory distress [Respiration, Rhythm And Depth] : normal respiratory rhythm and effort [Exaggerated Use Of Accessory Muscles For Inspiration] : no accessory muscle use [Costovertebral Angle Tenderness] : no ~M costovertebral angle tenderness [Normal Station and Gait] : the gait and station were normal for the patient's age [Skin Color & Pigmentation] : normal skin color and pigmentation [Oriented To Time, Place, And Person] : oriented to person, place, and time [Affect] : the affect was normal [Mood] : the mood was normal

## 2024-07-22 NOTE — REVIEW OF SYSTEMS
[Negative] : Heme/Lymph [Loss of interest] : loss of interest in sexual activity [Seen by urologist before (Name)  ___] : Previously seen by a urologist: [unfilled] [Urine retention] : urine retention [Wake up at night to urinate  How many times?  ___] : wakes up to urinate [unfilled] times during the night [Strong urge to urinate] : strong urge to urinate [Bladder pressure] : experiences bladder pressure [Wait a long time to urinate] : waits a long time to urinate [Slow urine stream] : slow urine stream [Interrupted urine stream] : interrupted urine stream [Bladder fullness after urinating] : bladder fullness after urinating [Joint Pain] : joint pain [Joint Swelling] : joint swelling [Limb Weakness] : limb weakness [Muscle Weakness] : muscle weakness

## 2024-07-24 ENCOUNTER — NON-APPOINTMENT (OUTPATIENT)
Age: 74
End: 2024-07-24

## 2024-07-24 NOTE — LETTER BODY
[Dear  ___] : Dear  [unfilled], [Consult Letter:] : I had the pleasure of evaluating your patient, [unfilled]. [Please see my note below.] : Please see my note below. [Consult Closing:] : Thank you very much for allowing me to participate in the care of this patient.  If you have any questions, please do not hesitate to contact me. [FreeTextEntry1] : Please see my note. I will keep you informed. [FreeTextEntry3] : Sincerely,  Henna Morales MD Clinical  [facility]

## 2024-07-24 NOTE — ASSESSMENT
[FreeTextEntry1] : 73 y/o F with urinary retention. Hx of AP repair 7/11/2024 and vaginal sling 2010. Currently performing CIC.   Patient advised to catheterize every 4 hours while she is awake and as soon as she wakes up. She should not exceed 450 ml. She states she has been reaching 750-800. I explained that I do not want her bladder to get overdistended as she will have a lower chance to regain bladder contractility.   I am sending the patient for a renal and bladder US to ensure there is no hydronephrosis from retention. She will check if her prior urologist has any recent renal US's.   I will send a note to Dr.Stephanie Barry requesting that she review the patient's hx and MRI report. In the meantime, I will arrange for video UDS and cystoscopy. I advised her to keep a log of how much she takes out when catheterizing the 3 days prior to UDS.

## 2024-07-24 NOTE — ASSESSMENT
[FreeTextEntry1] : 75 y/o F with urinary retention. Hx of AP repair 7/11/2024 and vaginal sling 2010. Currently performing CIC.   Patient advised to catheterize every 4 hours while she is awake and as soon as she wakes up. She should not exceed 450 ml. She states she has been reaching 750-800. I explained that I do not want her bladder to get overdistended as she will have a lower chance to regain bladder contractility.   I am sending the patient for a renal and bladder US to ensure there is no hydronephrosis from retention. She will check if her prior urologist has any recent renal US's.   I will send a note to Dr.Stephanie Barry requesting that she review the patient's hx and MRI report. In the meantime, I will arrange for video UDS and cystoscopy. I advised her to keep a log of how much she takes out when catheterizing the 3 days prior to UDS.

## 2024-07-24 NOTE — ADDENDUM
[FreeTextEntry1] : This note was authored by Melanie Burton working as a scribe for Dr.Tricia Morales. I, Dr.Tricia Morales have reviewed the content of this note and confirm it is true and accurate. I personally performed the history and physical examination and made all the decisions 07/22/2024

## 2024-07-24 NOTE — HISTORY OF PRESENT ILLNESS
[FreeTextEntry1] : Ms.Eileen Soler is a 75 y/o F. Had an AP repair 7/11/2024. Hx of vaginal sling 2010. Patient currently c/o retention and "obstructed urethra". Performing CIC since 7/15 after failing fill and pull voiding trial. In 2010, the sling helped with stress incontinence. Developed overactive bladder and treated with vesicare which worked very well for all urologic complaints. Did not work anymore in 2022. Has since failed myrbetriq, gemtesa, and ditropan. In Jan 2024, tried botox. Developed UTI immediately. In April tried botox again which did not help at all. Denies any gross hematuria or foul smelling urine. Prior to most recent surgery, she was not urinating well. Had a dripping urine stream for the last few years. Has not had UDS since 2010. No hx of recurrent UTIs. Has never been hospitalized for urinary retention or UTI.   She states that this morning she was waiting for the bathroom while her son was in there and could not hold her urine. She had the sensation that her bladder was full.   Patient's orthopedist had ordered an MRI of both hips 4/30/2024 which revealed some urological findings including severely distended bladder with diverticula, and possible impingement of urethra due to synovial cyst arising for pubic symphysis.   Pt is not diabetic. Is on ozempic for weight loss.   Takes gabapentin for neuropathy. Triple arthrodesis in both feet and developed neuropathy post op.   Denies smoking.

## 2024-07-24 NOTE — HISTORY OF PRESENT ILLNESS
[FreeTextEntry1] : Ms.Eileen Soler is a 73 y/o F. Had an AP repair 7/11/2024. Hx of vaginal sling 2010. Patient currently c/o retention and "obstructed urethra". Performing CIC since 7/15 after failing fill and pull voiding trial. In 2010, the sling helped with stress incontinence. Developed overactive bladder and treated with vesicare which worked very well for all urologic complaints. Did not work anymore in 2022. Has since failed myrbetriq, gemtesa, and ditropan. In Jan 2024, tried botox. Developed UTI immediately. In April tried botox again which did not help at all. Denies any gross hematuria or foul smelling urine. Prior to most recent surgery, she was not urinating well. Had a dripping urine stream for the last few years. Has not had UDS since 2010. No hx of recurrent UTIs. Has never been hospitalized for urinary retention or UTI.   She states that this morning she was waiting for the bathroom while her son was in there and could not hold her urine. She had the sensation that her bladder was full.   Patient's orthopedist had ordered an MRI of both hips 4/30/2024 which revealed some urological findings including severely distended bladder with diverticula, and possible impingement of urethra due to synovial cyst arising for pubic symphysis.   Pt is not diabetic. Is on ozempic for weight loss.   Takes gabapentin for neuropathy. Triple arthrodesis in both feet and developed neuropathy post op.   Denies smoking.

## 2024-07-24 NOTE — LETTER BODY
[Dear  ___] : Dear  [unfilled], [Consult Letter:] : I had the pleasure of evaluating your patient, [unfilled]. [Please see my note below.] : Please see my note below. [Consult Closing:] : Thank you very much for allowing me to participate in the care of this patient.  If you have any questions, please do not hesitate to contact me. [FreeTextEntry1] : Please see my note. I will keep you informed. [FreeTextEntry3] : Sincerely,  Henna Morales MD Clinical  [facility]

## 2024-07-31 ENCOUNTER — APPOINTMENT (OUTPATIENT)
Dept: UROLOGY | Facility: CLINIC | Age: 74
End: 2024-07-31

## 2024-07-31 ENCOUNTER — APPOINTMENT (OUTPATIENT)
Dept: UROLOGY | Facility: CLINIC | Age: 74
End: 2024-07-31
Payer: MEDICARE

## 2024-07-31 ENCOUNTER — OUTPATIENT (OUTPATIENT)
Dept: OUTPATIENT SERVICES | Facility: HOSPITAL | Age: 74
LOS: 1 days | End: 2024-07-31

## 2024-07-31 VITALS
SYSTOLIC BLOOD PRESSURE: 112 MMHG | DIASTOLIC BLOOD PRESSURE: 69 MMHG | RESPIRATION RATE: 16 BRPM | HEART RATE: 89 BPM | OXYGEN SATURATION: 99 % | TEMPERATURE: 97.5 F

## 2024-07-31 DIAGNOSIS — Z96.653 PRESENCE OF ARTIFICIAL KNEE JOINT, BILATERAL: Chronic | ICD-10-CM

## 2024-07-31 DIAGNOSIS — A49.9 URINARY TRACT INFECTION, SITE NOT SPECIFIED: ICD-10-CM

## 2024-07-31 DIAGNOSIS — R35.0 FREQUENCY OF MICTURITION: ICD-10-CM

## 2024-07-31 DIAGNOSIS — N32.0 BLADDER-NECK OBSTRUCTION: ICD-10-CM

## 2024-07-31 DIAGNOSIS — N39.0 URINARY TRACT INFECTION, SITE NOT SPECIFIED: ICD-10-CM

## 2024-07-31 DIAGNOSIS — N81.10 CYSTOCELE, UNSPECIFIED: Chronic | ICD-10-CM

## 2024-07-31 DIAGNOSIS — Z98.890 OTHER SPECIFIED POSTPROCEDURAL STATES: Chronic | ICD-10-CM

## 2024-07-31 PROCEDURE — 99214 OFFICE O/P EST MOD 30 MIN: CPT

## 2024-07-31 RX ORDER — LEVOFLOXACIN 750 MG/1
750 TABLET, FILM COATED ORAL DAILY
Qty: 5 | Refills: 0 | Status: ACTIVE | COMMUNITY
Start: 2024-07-31 | End: 1900-01-01

## 2024-07-31 RX ORDER — CIPROFLOXACIN HYDROCHLORIDE 500 MG/1
500 TABLET, FILM COATED ORAL
Qty: 10 | Refills: 0 | Status: DISCONTINUED | COMMUNITY
Start: 2024-07-31 | End: 2024-07-31

## 2024-07-31 NOTE — REVIEW OF SYSTEMS
[Dysuria] : dysuria [Urine Infection (bladder/kidney)] : bladder/kidney infection [Urine retention] : urine retention [Negative] : Heme/Lymph

## 2024-08-04 NOTE — LETTER BODY
[Dear  ___] : Dear  [unfilled], [Consult Letter:] : I had the pleasure of evaluating your patient, [unfilled]. [Please see my note below.] : Please see my note below. [Consult Closing:] : Thank you very much for allowing me to participate in the care of this patient.  If you have any questions, please do not hesitate to contact me. [FreeTextEntry1] : Please see my note. I will keep you informed. [FreeTextEntry3] : Sincerely,  Henna Morales MD Clinical  [facility]

## 2024-08-04 NOTE — LETTER BODY
[Dear  ___] : Dear  [unfilled], [Consult Letter:] : I had the pleasure of evaluating your patient, [unfilled]. [Please see my note below.] : Please see my note below. [FreeTextEntry1] : Please see my note. I will keep you informed. [Consult Closing:] : Thank you very much for allowing me to participate in the care of this patient.  If you have any questions, please do not hesitate to contact me. [FreeTextEntry3] : Sincerely,  Henna Morales MD Clinical  [facility]

## 2024-08-04 NOTE — ADDENDUM
[FreeTextEntry1] : This note was partly authored by Micky Lentz working as a scribe for GABRIELA Mitchell. I, GABRIELA Mitchell, have reviewed the content of this note and confirm it is true and accurate. I personally performed the history and physical examination and made all the decisions. 07/31/2024.

## 2024-08-04 NOTE — ASSESSMENT
[FreeTextEntry1] : 7/24/24 KEVIN 1) Echogenic masses within right lobe of the liver suspicious for hemangiomas 2) Left renal cysts.  Patient will speak with PCP about echogenic mass seen on right hepatic lobe.   Patient advised to keep catheterize every 4 hours while she is awake and as soon as she wakes up. She should not exceed 450 ml. Latest voiding diary: Volume catheterized all between 100 to 350. with 350 being the highest 11 voids and 3 at night yesterday when the symptoms started.  5 voids before coming to office today.   She will start Levofloxacin 750 mg one tablet daily until finished  Patient will have UDS and Cystoscopy and we will reschedule.  Also, have reached out to Dr. Barry's team to get her in for her complex pelvic reconstructive hx and c/o.

## 2024-08-04 NOTE — HISTORY OF PRESENT ILLNESS
[FreeTextEntry1] : 07/31/2024: Ms. SPENCER is a 74-year-old female with h/o vaginal sling 2010, and AP repair on 7/11/24.  Patient is currently in Retention with "obstructed urethra" and undergoing CIC since 7/2015 after failing TOV.   She returns today for Cystoscopy with UDS, but now presents with c/o UTI symptoms that started yesterday.  She complains of Dysuria.  Cysto and UDS subsequently canceled.  She brought lab report that showed patient with positive catheterize UC (pseudomonas) in 1/2024 and 2/2024.  She had 2 negative Cath'ed UC in 4/2024.   She is going to [place] on 8/3/24 and though it MAY be a false positive or colonizer, she is concerned due to her slight discomfort.  Patient says she has been increasing CIC frequency to limit excess volumes and also the large voided volumes which take a long time to evacuate, as instructed at our last visit.  Volumes catheterized are all between 100 to 350. with 350 being the highest 11 voids and 3 at night yesterday when the symptoms started.  5 voids before coming to office today.    In 2010, patient had sling that helped with stress incontinence.  Developed overactive bladder and treated with vesicare which worked very well for all urologic complaints. Did not work anymore in 2022.  Has since failed myrbetriq, gemtesa, and ditropan.  In Jan 2024, tried botox. Developed UTI immediately. In April tried botox again which did not help at all.   Prior to most recent surgery, she was not urinating well.  Had a dripping urine stream for the last few years. Has not had UDS since 2010.   -7/24/24 KEVIN shows  1) Echogenic masses within right lobe of the liver suspicious for hemangiomas 2) Left renal cysts.

## 2024-08-23 ENCOUNTER — APPOINTMENT (OUTPATIENT)
Dept: UROLOGY | Facility: CLINIC | Age: 74
End: 2024-08-23

## 2024-08-23 VITALS — HEART RATE: 82 BPM | OXYGEN SATURATION: 97 % | DIASTOLIC BLOOD PRESSURE: 80 MMHG | SYSTOLIC BLOOD PRESSURE: 128 MMHG

## 2024-08-23 DIAGNOSIS — N32.3 DIVERTICULUM OF BLADDER: ICD-10-CM

## 2024-08-23 DIAGNOSIS — R33.9 RETENTION OF URINE, UNSPECIFIED: ICD-10-CM

## 2024-08-23 DIAGNOSIS — N39.41 URGE INCONTINENCE: ICD-10-CM

## 2024-08-23 DIAGNOSIS — N32.0 BLADDER-NECK OBSTRUCTION: ICD-10-CM

## 2024-08-23 PROCEDURE — 51798 US URINE CAPACITY MEASURE: CPT

## 2024-08-23 PROCEDURE — 52000 CYSTOURETHROSCOPY: CPT

## 2024-08-23 PROCEDURE — 51784 ANAL/URINARY MUSCLE STUDY: CPT

## 2024-08-23 PROCEDURE — 99215 OFFICE O/P EST HI 40 MIN: CPT | Mod: 25

## 2024-08-23 PROCEDURE — 51728 CYSTOMETROGRAM W/VP: CPT

## 2024-08-23 NOTE — ASSESSMENT
[FreeTextEntry1] : 74-year-old female who presents for urinary retention, urge incontinence, voiding dysfunction  Explained that her history is unclear as the outside records are missing.  Ideally we would obtain clinic notes from 2023, status post Botox, at her initial meeting with Dr. Nielsen as well as her operative note.  I also explained that I would wait 6 months for the Botox to wear off as this may be impacting her bladder function as well.  Encouraged her to maintain a bladder diary documenting her void volumes and her cath volumes.  We reviewed the findings of the UDS which showed a normal bladder capacity, slight altered compliance, Pdet of 45 cm of water but incomplete emptying.  Her cystoscopy revealed significant trabeculation consistent with bladder outlet obstruction.  We will see if her emptying improves after the Botox wears off.  Otherwise, it may be worth reattempting removal of the sling.  We will work on getting her outside records to understand why this was unsuccessful.  Patient to return in 8 weeks We will work on getting her outside records

## 2024-08-23 NOTE — REASON FOR VISIT
[TextEntry] : 74-year-old female who presents for voiding dysfunction/retention  Patient's history is remarkable for a mid urethral sling in 2010 with Dr. Hinkle. She subsequently developed OAB and was managed with Vesicare.  This regimen worked well for her until 2022 when she underwent a right hip repair status post necrosis.  At that point she started cycling through OAB meds and her urgency urge incontinence progressed.  She ultimately opted for Botox in 1/2024.  She developed a UTI and found the Botox to be ineffective.  She opted for second dose in 4/2024.  Again she had no symptoms.  Her outside provider ordered an MRI for her back which showed a bladder diverticulum as well as trabeculation.  For this reason she sought a second opinion at [facility] with Dr. Louis.  None of the outside records are available.  Per patient the plan was to excise her mid urethral sling which would alleviate the obstruction however he was unable to find it during the surgery.  Ultimately he performed an AP repair in 7/2024.  She has been catheterizing since then.  As of now she catheterizes twice daily and finds her residuals are about 150 to 200 cc.  She is voiding spontaneously throughout the day.  She also has severe back issues.  She has a bulging disc with stenosis and arthritis.  She has numbness in her leg.  She was seen by her neurologist for this even.  She is trialed PT in the past.  She had a recent renal bladder ultrasound which showed renal cysts and hemangioma in her liver.  She presents today for UDS/cystoscopy

## 2024-08-23 NOTE — PHYSICAL EXAM
[Normal Appearance] : normal appearance [Well Groomed] : well groomed [General Appearance - In No Acute Distress] : no acute distress [Urethral Meatus] : the meatus of the urethra showed no abnormalities [] : no rash [No Focal Deficits] : no focal deficits [Oriented To Time, Place, And Person] : oriented to person, place, and time [Affect] : the affect was normal [Mood] : the mood was normal [de-identified] : neg CST, well healed vaginal incisions posterior, anterior, no palpable mesh, nontender [de-identified] : +scars in knees bilaterally

## 2024-09-04 ENCOUNTER — APPOINTMENT (OUTPATIENT)
Dept: UROLOGY | Facility: CLINIC | Age: 74
End: 2024-09-04

## 2024-09-18 ENCOUNTER — APPOINTMENT (OUTPATIENT)
Dept: UROLOGY | Facility: CLINIC | Age: 74
End: 2024-09-18

## 2024-10-14 ENCOUNTER — APPOINTMENT (OUTPATIENT)
Dept: UROLOGY | Facility: CLINIC | Age: 74
End: 2024-10-14
Payer: MEDICARE

## 2024-10-14 VITALS
HEART RATE: 79 BPM | WEIGHT: 180 LBS | DIASTOLIC BLOOD PRESSURE: 71 MMHG | OXYGEN SATURATION: 96 % | RESPIRATION RATE: 16 BRPM | HEIGHT: 61 IN | SYSTOLIC BLOOD PRESSURE: 126 MMHG | BODY MASS INDEX: 33.99 KG/M2

## 2024-10-14 DIAGNOSIS — N32.0 BLADDER-NECK OBSTRUCTION: ICD-10-CM

## 2024-10-14 DIAGNOSIS — R33.9 RETENTION OF URINE, UNSPECIFIED: ICD-10-CM

## 2024-10-14 PROCEDURE — G2211 COMPLEX E/M VISIT ADD ON: CPT

## 2024-10-14 PROCEDURE — 99214 OFFICE O/P EST MOD 30 MIN: CPT

## 2024-11-18 ENCOUNTER — APPOINTMENT (OUTPATIENT)
Dept: UROLOGY | Facility: CLINIC | Age: 74
End: 2024-11-18
Payer: MEDICARE

## 2024-11-18 VITALS
OXYGEN SATURATION: 98 % | SYSTOLIC BLOOD PRESSURE: 120 MMHG | RESPIRATION RATE: 16 BRPM | DIASTOLIC BLOOD PRESSURE: 77 MMHG | HEART RATE: 86 BPM

## 2024-11-18 PROCEDURE — G2211 COMPLEX E/M VISIT ADD ON: CPT

## 2024-11-18 PROCEDURE — 99214 OFFICE O/P EST MOD 30 MIN: CPT

## 2024-11-18 RX ORDER — TROSPIUM CHLORIDE 20 MG/1
20 TABLET, FILM COATED ORAL TWICE DAILY
Qty: 60 | Refills: 6 | Status: ACTIVE | COMMUNITY
Start: 2024-11-18 | End: 1900-01-01

## 2024-11-19 LAB
APPEARANCE: ABNORMAL
BACTERIA: ABNORMAL /HPF
BILIRUBIN URINE: NEGATIVE
BLOOD URINE: NEGATIVE
CAST: 1 /LPF
COLOR: YELLOW
EPITHELIAL CELLS: 2 /HPF
GLUCOSE QUALITATIVE U: NEGATIVE MG/DL
KETONES URINE: NEGATIVE MG/DL
LEUKOCYTE ESTERASE URINE: ABNORMAL
MICROSCOPIC-UA: NORMAL
NITRITE URINE: POSITIVE
PH URINE: 7
PROTEIN URINE: NORMAL MG/DL
RED BLOOD CELLS URINE: 0 /HPF
SPECIFIC GRAVITY URINE: 1.01
UROBILINOGEN URINE: 0.2 MG/DL
WHITE BLOOD CELLS URINE: 3 /HPF

## 2024-11-21 LAB — BACTERIA UR CULT: NORMAL

## 2024-11-27 ENCOUNTER — APPOINTMENT (OUTPATIENT)
Dept: UROLOGY | Facility: CLINIC | Age: 74
End: 2024-11-27
Payer: MEDICARE

## 2024-11-27 DIAGNOSIS — R33.9 RETENTION OF URINE, UNSPECIFIED: ICD-10-CM

## 2024-11-27 DIAGNOSIS — R39.15 URGENCY OF URINATION: ICD-10-CM

## 2024-11-27 PROCEDURE — 99214 OFFICE O/P EST MOD 30 MIN: CPT

## 2024-11-27 PROCEDURE — 76857 US EXAM PELVIC LIMITED: CPT

## 2024-11-27 PROCEDURE — G2211 COMPLEX E/M VISIT ADD ON: CPT

## 2024-12-31 ENCOUNTER — APPOINTMENT (OUTPATIENT)
Dept: UROLOGY | Facility: CLINIC | Age: 74
End: 2024-12-31
Payer: MEDICARE

## 2024-12-31 DIAGNOSIS — R39.15 URGENCY OF URINATION: ICD-10-CM

## 2024-12-31 DIAGNOSIS — N39.41 URGE INCONTINENCE: ICD-10-CM

## 2024-12-31 DIAGNOSIS — R33.9 RETENTION OF URINE, UNSPECIFIED: ICD-10-CM

## 2024-12-31 PROCEDURE — 99442: CPT

## 2025-01-02 ENCOUNTER — APPOINTMENT (OUTPATIENT)
Dept: UROLOGY | Facility: HOSPITAL | Age: 75
End: 2025-01-02

## 2025-01-16 ENCOUNTER — APPOINTMENT (OUTPATIENT)
Dept: UROLOGY | Facility: HOSPITAL | Age: 75
End: 2025-01-16

## 2025-04-16 ENCOUNTER — APPOINTMENT (OUTPATIENT)
Dept: UROLOGY | Facility: CLINIC | Age: 75
End: 2025-04-16
Payer: MEDICARE

## 2025-04-16 DIAGNOSIS — R39.15 URGENCY OF URINATION: ICD-10-CM

## 2025-04-16 DIAGNOSIS — R33.9 RETENTION OF URINE, UNSPECIFIED: ICD-10-CM

## 2025-04-16 PROCEDURE — G2211 COMPLEX E/M VISIT ADD ON: CPT

## 2025-04-16 PROCEDURE — 99213 OFFICE O/P EST LOW 20 MIN: CPT

## 2025-04-30 ENCOUNTER — APPOINTMENT (OUTPATIENT)
Dept: UROLOGY | Facility: CLINIC | Age: 75
End: 2025-04-30
Payer: MEDICARE

## 2025-04-30 ENCOUNTER — NON-APPOINTMENT (OUTPATIENT)
Age: 75
End: 2025-04-30

## 2025-04-30 VITALS
WEIGHT: 180 LBS | HEART RATE: 70 BPM | HEIGHT: 61 IN | RESPIRATION RATE: 17 BRPM | BODY MASS INDEX: 33.99 KG/M2 | DIASTOLIC BLOOD PRESSURE: 84 MMHG | SYSTOLIC BLOOD PRESSURE: 145 MMHG | TEMPERATURE: 98.4 F

## 2025-04-30 DIAGNOSIS — Z63.4 DISAPPEARANCE AND DEATH OF FAMILY MEMBER: ICD-10-CM

## 2025-04-30 DIAGNOSIS — A49.9 URINARY TRACT INFECTION, SITE NOT SPECIFIED: ICD-10-CM

## 2025-04-30 DIAGNOSIS — K59.00 CONSTIPATION, UNSPECIFIED: ICD-10-CM

## 2025-04-30 DIAGNOSIS — N39.0 URINARY TRACT INFECTION, SITE NOT SPECIFIED: ICD-10-CM

## 2025-04-30 DIAGNOSIS — R33.9 RETENTION OF URINE, UNSPECIFIED: ICD-10-CM

## 2025-04-30 PROCEDURE — 51798 US URINE CAPACITY MEASURE: CPT

## 2025-04-30 PROCEDURE — 99215 OFFICE O/P EST HI 40 MIN: CPT | Mod: 25

## 2025-04-30 PROCEDURE — 99459 PELVIC EXAMINATION: CPT

## 2025-04-30 RX ORDER — ESTRADIOL 0.1 MG/G
0.1 CREAM VAGINAL
Qty: 1 | Refills: 6 | Status: ACTIVE | COMMUNITY
Start: 2025-04-30 | End: 1900-01-01

## 2025-04-30 RX ORDER — WHEAT DEXTRIN 3 G/4 G
POWDER (GRAM) ORAL
Qty: 1 | Refills: 3 | Status: ACTIVE | COMMUNITY
Start: 2025-04-30 | End: 1900-01-01

## 2025-04-30 SDOH — SOCIAL STABILITY - SOCIAL INSECURITY: DISSAPEARANCE AND DEATH OF FAMILY MEMBER: Z63.4

## 2025-05-01 PROBLEM — N39.0 RECURRENT UTI: Status: ACTIVE | Noted: 2025-05-01

## 2025-05-01 PROBLEM — R33.9 RETENTION OF URINE: Status: ACTIVE | Noted: 2025-04-30

## 2025-05-01 LAB
APPEARANCE: ABNORMAL
BACTERIA: ABNORMAL /HPF
BILIRUBIN URINE: NEGATIVE
BLOOD URINE: ABNORMAL
CAST: 4 /LPF
COLOR: YELLOW
EPITHELIAL CELLS: 2 /HPF
GLUCOSE QUALITATIVE U: NEGATIVE MG/DL
HYALINE CASTS: PRESENT
KETONES URINE: NEGATIVE MG/DL
LEUKOCYTE ESTERASE URINE: ABNORMAL
MICROSCOPIC-UA: NORMAL
NITRITE URINE: POSITIVE
PH URINE: 5.5
PROTEIN URINE: NORMAL MG/DL
RED BLOOD CELLS URINE: 0 /HPF
REVIEW: NORMAL
SPECIFIC GRAVITY URINE: 1.02
UROBILINOGEN URINE: 0.2 MG/DL
WHITE BLOOD CELLS URINE: 88 /HPF
YEAST-LIKE CELLS: PRESENT

## 2025-05-05 DIAGNOSIS — N30.00 ACUTE CYSTITIS W/OUT HEMATURIA: ICD-10-CM

## 2025-05-05 LAB — BACTERIA UR CULT: ABNORMAL

## 2025-05-05 RX ORDER — CEPHALEXIN 500 MG/1
500 TABLET ORAL
Qty: 14 | Refills: 0 | Status: ACTIVE | COMMUNITY
Start: 2025-05-05 | End: 1900-01-01

## 2025-05-07 ENCOUNTER — OUTPATIENT (OUTPATIENT)
Dept: OUTPATIENT SERVICES | Facility: HOSPITAL | Age: 75
LOS: 1 days | End: 2025-05-07
Payer: MEDICARE

## 2025-05-07 ENCOUNTER — APPOINTMENT (OUTPATIENT)
Dept: UROLOGY | Facility: CLINIC | Age: 75
End: 2025-05-07
Payer: MEDICARE

## 2025-05-07 VITALS — HEART RATE: 69 BPM | SYSTOLIC BLOOD PRESSURE: 126 MMHG | DIASTOLIC BLOOD PRESSURE: 78 MMHG

## 2025-05-07 DIAGNOSIS — R82.71 BACTERIURIA: ICD-10-CM

## 2025-05-07 DIAGNOSIS — R35.0 FREQUENCY OF MICTURITION: ICD-10-CM

## 2025-05-07 DIAGNOSIS — Z98.890 OTHER SPECIFIED POSTPROCEDURAL STATES: Chronic | ICD-10-CM

## 2025-05-07 DIAGNOSIS — N81.10 CYSTOCELE, UNSPECIFIED: Chronic | ICD-10-CM

## 2025-05-07 DIAGNOSIS — Z85.820 PERSONAL HISTORY OF MALIGNANT MELANOMA OF SKIN: Chronic | ICD-10-CM

## 2025-05-07 DIAGNOSIS — Z96.653 PRESENCE OF ARTIFICIAL KNEE JOINT, BILATERAL: Chronic | ICD-10-CM

## 2025-05-07 PROCEDURE — 52000 CYSTOURETHROSCOPY: CPT

## 2025-05-07 PROCEDURE — 51741 ELECTRO-UROFLOWMETRY FIRST: CPT

## 2025-05-07 PROCEDURE — 51798 US URINE CAPACITY MEASURE: CPT

## 2025-05-07 PROCEDURE — 99214 OFFICE O/P EST MOD 30 MIN: CPT | Mod: 25

## 2025-05-07 PROCEDURE — 51741 ELECTRO-UROFLOWMETRY FIRST: CPT | Mod: 26

## 2025-05-08 ENCOUNTER — OUTPATIENT (OUTPATIENT)
Dept: OUTPATIENT SERVICES | Facility: HOSPITAL | Age: 75
LOS: 1 days | End: 2025-05-08
Payer: MEDICARE

## 2025-05-08 VITALS
TEMPERATURE: 98 F | HEART RATE: 76 BPM | OXYGEN SATURATION: 96 % | RESPIRATION RATE: 18 BRPM | WEIGHT: 182.98 LBS | HEIGHT: 61 IN | SYSTOLIC BLOOD PRESSURE: 102 MMHG | DIASTOLIC BLOOD PRESSURE: 65 MMHG

## 2025-05-08 DIAGNOSIS — R33.9 RETENTION OF URINE, UNSPECIFIED: ICD-10-CM

## 2025-05-08 DIAGNOSIS — Z01.818 ENCOUNTER FOR OTHER PREPROCEDURAL EXAMINATION: ICD-10-CM

## 2025-05-08 DIAGNOSIS — Z98.890 OTHER SPECIFIED POSTPROCEDURAL STATES: Chronic | ICD-10-CM

## 2025-05-08 DIAGNOSIS — Z98.1 ARTHRODESIS STATUS: Chronic | ICD-10-CM

## 2025-05-08 DIAGNOSIS — N81.10 CYSTOCELE, UNSPECIFIED: Chronic | ICD-10-CM

## 2025-05-08 DIAGNOSIS — Z85.820 PERSONAL HISTORY OF MALIGNANT MELANOMA OF SKIN: Chronic | ICD-10-CM

## 2025-05-08 DIAGNOSIS — Z96.641 PRESENCE OF RIGHT ARTIFICIAL HIP JOINT: Chronic | ICD-10-CM

## 2025-05-08 DIAGNOSIS — N39.0 URINARY TRACT INFECTION, SITE NOT SPECIFIED: ICD-10-CM

## 2025-05-08 DIAGNOSIS — Z96.653 PRESENCE OF ARTIFICIAL KNEE JOINT, BILATERAL: Chronic | ICD-10-CM

## 2025-05-08 LAB
ANION GAP SERPL CALC-SCNC: 13 MMOL/L — SIGNIFICANT CHANGE UP (ref 5–17)
APPEARANCE: CLEAR
BACTERIA: NEGATIVE /HPF
BILIRUBIN URINE: NEGATIVE
BLD GP AB SCN SERPL QL: POSITIVE — SIGNIFICANT CHANGE UP
BLOOD URINE: NEGATIVE
BUN SERPL-MCNC: 21 MG/DL — SIGNIFICANT CHANGE UP (ref 7–23)
CALCIUM SERPL-MCNC: 9.3 MG/DL — SIGNIFICANT CHANGE UP (ref 8.4–10.5)
CAST: 0 /LPF
CHLORIDE SERPL-SCNC: 102 MMOL/L — SIGNIFICANT CHANGE UP (ref 96–108)
CO2 SERPL-SCNC: 25 MMOL/L — SIGNIFICANT CHANGE UP (ref 22–31)
COLOR: YELLOW
CREAT SERPL-MCNC: 1.01 MG/DL — SIGNIFICANT CHANGE UP (ref 0.5–1.3)
EGFR: 59 ML/MIN/1.73M2 — LOW
EGFR: 59 ML/MIN/1.73M2 — LOW
EPITHELIAL CELLS: 2 /HPF
GLUCOSE QUALITATIVE U: NEGATIVE MG/DL
GLUCOSE SERPL-MCNC: 82 MG/DL — SIGNIFICANT CHANGE UP (ref 70–99)
HCT VFR BLD CALC: 35.7 % — SIGNIFICANT CHANGE UP (ref 34.5–45)
HGB BLD-MCNC: 11.6 G/DL — SIGNIFICANT CHANGE UP (ref 11.5–15.5)
KETONES URINE: NEGATIVE MG/DL
LEUKOCYTE ESTERASE URINE: ABNORMAL
MCHC RBC-ENTMCNC: 31.4 PG — SIGNIFICANT CHANGE UP (ref 27–34)
MCHC RBC-ENTMCNC: 32.5 G/DL — SIGNIFICANT CHANGE UP (ref 32–36)
MCV RBC AUTO: 96.7 FL — SIGNIFICANT CHANGE UP (ref 80–100)
MICROSCOPIC-UA: NORMAL
NITRITE URINE: NEGATIVE
NRBC BLD AUTO-RTO: 0 /100 WBCS — SIGNIFICANT CHANGE UP (ref 0–0)
PH URINE: 6
PLATELET # BLD AUTO: 248 K/UL — SIGNIFICANT CHANGE UP (ref 150–400)
POTASSIUM SERPL-MCNC: 4.3 MMOL/L — SIGNIFICANT CHANGE UP (ref 3.5–5.3)
POTASSIUM SERPL-SCNC: 4.3 MMOL/L — SIGNIFICANT CHANGE UP (ref 3.5–5.3)
PROTEIN URINE: NEGATIVE MG/DL
RBC # BLD: 3.69 M/UL — LOW (ref 3.8–5.2)
RBC # FLD: 13.1 % — SIGNIFICANT CHANGE UP (ref 10.3–14.5)
RED BLOOD CELLS URINE: 2 /HPF
RH IG SCN BLD-IMP: POSITIVE — SIGNIFICANT CHANGE UP
SODIUM SERPL-SCNC: 140 MMOL/L — SIGNIFICANT CHANGE UP (ref 135–145)
SPECIFIC GRAVITY URINE: 1.02
UROBILINOGEN URINE: 0.2 MG/DL
WBC # BLD: 5.38 K/UL — SIGNIFICANT CHANGE UP (ref 3.8–10.5)
WBC # FLD AUTO: 5.38 K/UL — SIGNIFICANT CHANGE UP (ref 3.8–10.5)
WHITE BLOOD CELLS URINE: 12 /HPF

## 2025-05-08 PROCEDURE — 86870 RBC ANTIBODY IDENTIFICATION: CPT

## 2025-05-08 PROCEDURE — G0463: CPT

## 2025-05-08 PROCEDURE — 86900 BLOOD TYPING SEROLOGIC ABO: CPT

## 2025-05-08 PROCEDURE — 80048 BASIC METABOLIC PNL TOTAL CA: CPT

## 2025-05-08 PROCEDURE — 86850 RBC ANTIBODY SCREEN: CPT

## 2025-05-08 PROCEDURE — 86880 COOMBS TEST DIRECT: CPT

## 2025-05-08 PROCEDURE — 86905 BLOOD TYPING RBC ANTIGENS: CPT

## 2025-05-08 PROCEDURE — 86077 PHYS BLOOD BANK SERV XMATCH: CPT

## 2025-05-08 PROCEDURE — 85027 COMPLETE CBC AUTOMATED: CPT

## 2025-05-08 PROCEDURE — 86901 BLOOD TYPING SEROLOGIC RH(D): CPT

## 2025-05-08 RX ORDER — LEVOTHYROXINE SODIUM 300 MCG
1 TABLET ORAL
Refills: 0 | DISCHARGE

## 2025-05-08 RX ORDER — ROSUVASTATIN CALCIUM 20 MG/1
1 TABLET, FILM COATED ORAL
Refills: 0 | DISCHARGE

## 2025-05-08 RX ORDER — FLUTICASONE PROPIONATE 50 UG/1
1 SPRAY, METERED NASAL
Refills: 0 | DISCHARGE

## 2025-05-08 RX ORDER — IRBESARTAN 75 MG/1
1 TABLET ORAL
Refills: 0 | DISCHARGE

## 2025-05-08 RX ORDER — ASPIRIN 325 MG
1 TABLET ORAL
Refills: 0 | DISCHARGE

## 2025-05-08 RX ORDER — GABAPENTIN 400 MG/1
1 CAPSULE ORAL
Refills: 0 | DISCHARGE

## 2025-05-08 RX ORDER — EZETIMIBE 10 MG/1
1 TABLET ORAL
Refills: 0 | DISCHARGE

## 2025-05-08 NOTE — H&P PST ADULT - MUSCULOSKELETAL
ROM intact/strength 5/5 bilateral upper extremities/strength 5/5 bilateral lower extremities/abnormal gait details…

## 2025-05-08 NOTE — H&P PST ADULT - HISTORY OF PRESENT ILLNESS
74yr old female presents to PST with c/o incomplete bladder emptying, urge incontinence, recurrent UTI's (self caths @ home at least twice daily, currently on Keflex) with history of mid-urethral sling 2010 with Dr. Hinkle and is now scheduled for a Revision of Sling Cystoscopy and Rectocele repair on 6/2/2025. PMH also includes: lumbar stenosis/ DDD, OA, (s/p bilateral knee replacements 2013, hip replacement 2022), HTN, HLD, peripheral neuropathy (gabapentin), hypothyroid, hx of cardiac murmur (followed by Cards) and hx of RLE DVT (post hip sx in 2022 now on daily ASA). Denies recent fevers, chills, cough, chest pain or SOB and feels well overall.  74yr old female presents to PST with c/o incomplete bladder emptying, urge incontinence, recurrent UTI's (self caths @ home at least twice daily, currently on Keflex) with history of mid-urethral sling  with Dr. Hinkle and is now scheduled for a Revision of Sling Cystoscopy and Rectocele repair on 2025. PMH also includes: lumbar stenosis/ DDD, OA, (s/p bilateral knee replacements , hip replacement ), HTN, HLD, peripheral neuropathy (gabapentin), hypothyroid, hx of cardiac murmur (followed by Cards) and hx of RLE DVT (post hip sx in  now on daily ASA). Denies recent fevers, chills, cough, chest pain or SOB and feels well overall.     The patient is here with the following issues:  1) incomplete bladder emptying  2) urgency incontinence  3) fecal incontinence - smearing  4) recurrent UTI  5) stage II posterior wall prolapse  ?  The patient is referred by Dr. Barry.  ?  She has a history of mid-urethral sling 2010 with Dr. Hinkle, disc disease, bilateral knee replacements , hip replacement , HTN, HLD, peripheral neuropathy (gabapentin). In  she had a melanoma excised from her ankle. She works part time as a PT assistant.  ?  She had a urine sample done yesterday.  She saw a colorectal specialist who agreed with the rectocele.  She is very active and does not become short of breath with exercise.  ?  She does not have any operative reports scanned into the system.  ?  the patient underwent mid-urethral sling  at [place] with Dr. Qian Hinkle. She subsequently developed OAB and was managed with Vesicare. This regimen worked well for her until  when she underwent a right hip repair status post necrosis. At that point she started cycling through OAB meds and her urgency incontinence progressed.  ?  2024 she underwent onabotulinumtoxinA injection with 100U. She developed a UTI and found the Botox to be ineffective. She opted for second dose in 2024.  ?  She then saw Dr. Louis at [facility] who performed vaginal prolapse repair.  ?  the patient underwent urodynamics 2024. I independently reviewed records today.  ?  The patient voids q1h during the day and 2-3 times at night.  She catheterizes 1-2 times per day for 200-250 ml at a time.  She does not know what size catheter she uses.  If she lays back she can void better.  ?  Drinks 20-25 oz. of fluid per day including water.  She does not drink much due to urgency incontinence.  She is still bothered by urgency incontinence.  She wears 2 pads per day that are wet.  ?  + urgency and + urgency incontinence.  The patient denies stress urinary incontinence.  ?  + decreased stream, +hesitancy, + intermittency and + straining.  She can lie back and push her pelvic forward.  She very seldom has volitional voiding.  The patient feels incomplete bladder emptying.  She has approximately 2 UTIs per year.  ?  + recurrent UTIs and denies hematuria.  Denies vaginal bulge. She did not feel one before her surgery with Dr. Louis.  ?  1 bowel movement every 2-3 days that are [place] V.  She takes immodium.  ?  The patient has fecal incontinence that is smearing.  ?  The patient denies pelvic and bladder pain.  ?  cystoscopy today 25 demonstrated  1) severe trabeculation with cellule formation  2) left ureteral orifice - golf hole appearance  3) negative cough stress test  4) large capacity bladder >500 ml  5) open bladder neck  6) sling tenting up urethra  Uroflow:  ml, Qmax 9.2 Qave 3.3 ml/s plateau continuous smooth  Post-void residual was measured by US today and was 45 mL.  ?  ?  PREVIOUS HISTORY/DATA REVIEW  25- visit with Dr. Barry - voids then performs CIC. Would like to undergo sling revision  24 - translabial US with Dr. Barry - mesh seen, plan for sling excision, trospium 20 mg  24 - UCx > 100K E. coli, Ua 3 wbc 0 rbc  24 - urodynamics with Dr. Barry - independently reviewed by me today 25  1) detrusor overactivity  2) detrusor overactivity incontinence  3) large capacity bladder - 688 ml  4) incomplete bladder emptying - 540 ml  5) bladder outlet obstruction - Qmax 3 ml/s, PdetQmax 45 cm H2O  24 - cystoscopy with Dr. Barry - severe trabeculation, erythematous patches, normal bladder neck  24 - visit with Dr. Barry - completed pelvic floor PT  24 - Ucx > 100K E. coli  24 - renal US - liver hemangiomas, L renal cyst, prevoid 382 ml, PVR 10 ml  24 - initial visit with Dr. Henna Morales - referred to Dr. Barry  06/10/24 - visit with Dr. Brando Louis -  ml, plan for removal of sling and cystocele/rectocele repair  24 - intradetrusor onabotulinumtoxinA with 100U - Dr. Hinkle  24 - intradetrusor onabotulinumtoxinA with 100U,  ml. Previously tried Vesicare, Solifenacin, oxybutynin, trospium, Gemtesa  2024 - intradetrusor onabotulinumtoxinA with 100U, PVR 90 ml  2024 - Operative procedure urethral lysis, cystoscopy, anterior and posterior colporrhaphy; unable to find the urethral sling. (unable to find operative report in scanned records)  ?  OBGYN HISTORY  : 5  Parity: 3  Vaginal deliveries: 3   sections: 0  Currently sexually active: No  Sexual dysfunction:  Postmenopausal: Yes  ?  Hormone Replacement Therapy: No  Vaginal estrogen: No  Hysterectomy: Yes/No  Most recent Pap date:  History of Abnormal Pap: Yes/No  ?

## 2025-05-08 NOTE — H&P PST ADULT - NSICDXPASTSURGICALHX_GEN_ALL_CORE_FT
PAST SURGICAL HISTORY:  Female bladder prolapse s/p sling (2014)    H/O total knee replacement, bilateral 5/2013    History of arthrodesis     History of colonoscopy     History of cystoscopy     History of endoscopy     History of malignant melanoma left leg, s/p excision (1978)    History of total right hip replacement     S/P bunionectomy right

## 2025-05-08 NOTE — H&P PST ADULT - TEMPERATURE IN FAHRENHEIT (DEGREES F)
Quality 137: Melanoma: Continuity Of Care - Recall System: Patient information entered into a recall system that includes: target date for the next exam specified AND a process to follow up with patients regarding missed or unscheduled appointments Quality 138: Melanoma: Coordination Of Care: The patient does not have a PCP or referring physician. Show Follow-Up Variable: Yes Detail Level: Detailed When Should The Patient Follow-Up For Their Next Full-Body Skin Exam?: 6 Months 97.9

## 2025-05-08 NOTE — H&P PST ADULT - PRIMARY CARE PROVIDER
Try taking Tramadol for your headaches.  You can combine this with Ibuprofen and Tylenol as well.  (Do not exceed 4,000 mg of Tylenol in one day).    Please follow up with Dr. Canchola (Neurology) for management of your headaches as well as follow up on the neuropsych testing.      Thank you for choosing the [facility], Sourav Cristina MD, and our team as your Neurology Specialists.  We actively use feedback to constantly improve and deliver the best care possible. To provide the best experience we are collecting feedback from you on how we performed.  You may receive a survey in the mail to evaluate how we did. Please take a moment and share your thoughts.      If for any reason you feel that we did not meet your expectations or you want to share a positive experience, please give us a call. Your feedback helps us know how we are doing and what we can be doing better.    Office hours: 8:00 am to 4:30 pm, Monday - Friday  Phone: [phone]   Dr Marielena Gould (PCP) [phone]

## 2025-05-08 NOTE — H&P PST ADULT - NSICDXFAMILYHX_GEN_ALL_CORE_FT
FAMILY HISTORY:  Father  Still living? No  Family history of CHF (congestive heart failure), Age at diagnosis: Age Unknown  Family history of renal disease, Age at diagnosis: Age Unknown    Mother  Still living? Yes, Estimated age:   Family history of rheumatic fever, Age at diagnosis: Age Unknown

## 2025-05-08 NOTE — H&P PST ADULT - PROBLEM SELECTOR PLAN 1
Scheduled for surgery on 6/2/2025. Surgical instructions reviewed and provided to pt. On daily ASA to continue. Scheduled for surgery on 6/2/2025. Surgical instructions reviewed and provided to pt. On daily ASA to continue.    1) bladder outlet obstruction  Explained nature of sling revision in lay person's terms. She is afraid of stress urinary incontinence. Discussed cutting vs. removing sling. No indication to remove the entire sling, as she denies any pain. Potential risks were discussed including but not limited to bleeding, infection, damage to adjacent structures, urinary incontinence, formation of scar tissue, non-healing, chronic pain, anesthesia-related complications such as cardiac, pulmonary, and embolic complications.  Discussed that she could have stress urinary incontinence following the procedure. Discussed that this can be treated with bulking agent injection.  ?  Discussed that she may need to keep in a [place] catheter postop.  ?  2) stress urinary incontinence  Discussed Bulkamid in the future if she has incontinence s/p sling revision.    3) urgency incontinence  Discussed relationship between OAB and bladder outlet obstruction. This may or may not improve s/p sling revision. Discussed that if it does not improve, this will be easier to treat.  Explained nature of overactive bladder, causes and prognosis. Lifestyle changes were discussed including avoiding bladder irritants, timed voiding, urge supression. Treatment were discussed including observation, lifestyle changes, oral medications, intradetrusor botulinum toxin injection, posterior tibial nerve stimulation, sacral neuromodulation.    4) posterior wall prolapse  The patient also has incomplete coaptation of her anal sphincter. Explained the nature of posterior colporrhaphy and repair of anal sphincter in lay person's terms. Potential risks discussed including bleeding, infection, damage to adjacent structures, formation of scar tissue, non-healing, chronic pain.     All questions answered to satisfaction.

## 2025-05-08 NOTE — H&P PST ADULT - NSICDXPASTMEDICALHX_GEN_ALL_CORE_FT
PAST MEDICAL HISTORY:  Anemia     BMI 35.0-35.9,adult     DDD (degenerative disc disease), lumbar     History of cardiac murmur     History of deep venous thrombosis (DVT) of distal vein of right lower extremity     History of peripheral neuropathy     Hypercholesteremia     Hypertension     Hypothyroid     Malignant melanoma of leg 1978    OA (osteoarthritis)     Retention of urine     Urinary tract infection, site not specified

## 2025-05-11 ENCOUNTER — NON-APPOINTMENT (OUTPATIENT)
Age: 75
End: 2025-05-11

## 2025-05-11 LAB — BACTERIA UR CULT: NORMAL

## 2025-05-13 DIAGNOSIS — Z87.440 PERSONAL HISTORY OF URINARY (TRACT) INFECTIONS: ICD-10-CM

## 2025-05-13 DIAGNOSIS — R82.71 BACTERIURIA: ICD-10-CM

## 2025-05-13 DIAGNOSIS — R33.9 RETENTION OF URINE, UNSPECIFIED: ICD-10-CM

## 2025-05-20 ENCOUNTER — APPOINTMENT (OUTPATIENT)
Dept: UROLOGY | Facility: CLINIC | Age: 75
End: 2025-05-20

## 2025-05-21 ENCOUNTER — APPOINTMENT (OUTPATIENT)
Dept: UROLOGY | Facility: CLINIC | Age: 75
End: 2025-05-21
Payer: MEDICARE

## 2025-05-21 PROCEDURE — 99214 OFFICE O/P EST MOD 30 MIN: CPT | Mod: 2W

## 2025-05-21 RX ORDER — CHLORHEXIDINE GLUCONATE 40 MG/ML
4 SOLUTION TOPICAL
Qty: 1 | Refills: 0 | Status: ACTIVE | COMMUNITY
Start: 2025-05-21 | End: 1900-01-01

## 2025-05-21 RX ORDER — DOCUSATE SODIUM 100 MG/1
100 CAPSULE, LIQUID FILLED ORAL 3 TIMES DAILY
Qty: 90 | Refills: 2 | Status: ACTIVE | COMMUNITY
Start: 2025-05-21 | End: 1900-01-01

## 2025-05-21 RX ORDER — SENNOSIDES 8.6 MG TABLETS 8.6 MG/1
8.6 TABLET ORAL
Qty: 180 | Refills: 3 | Status: ACTIVE | COMMUNITY
Start: 2025-05-21 | End: 1900-01-01

## 2025-05-21 RX ORDER — POLYETHYLENE GLYCOL 3350 17 G/17G
17 POWDER, FOR SOLUTION ORAL DAILY
Qty: 5 | Refills: 3 | Status: ACTIVE | COMMUNITY
Start: 2025-05-21 | End: 1900-01-01

## 2025-05-22 PROBLEM — Z86.718 PERSONAL HISTORY OF OTHER VENOUS THROMBOSIS AND EMBOLISM: Chronic | Status: ACTIVE | Noted: 2025-05-08

## 2025-05-22 PROBLEM — M51.369 OTHER INTERVERTEBRAL DISC DEGENERATION, LUMBAR REGION WITHOUT MENTION OF LUMBAR BACK PAIN OR LOWER EXTREMITY PAIN: Chronic | Status: ACTIVE | Noted: 2025-05-08

## 2025-05-22 PROBLEM — Z86.79 PERSONAL HISTORY OF OTHER DISEASES OF THE CIRCULATORY SYSTEM: Chronic | Status: ACTIVE | Noted: 2025-05-08

## 2025-05-22 PROBLEM — M19.90 UNSPECIFIED OSTEOARTHRITIS, UNSPECIFIED SITE: Chronic | Status: ACTIVE | Noted: 2025-05-08

## 2025-05-22 PROBLEM — N39.0 URINARY TRACT INFECTION, SITE NOT SPECIFIED: Chronic | Status: ACTIVE | Noted: 2025-05-08

## 2025-05-22 PROBLEM — R33.9 RETENTION OF URINE, UNSPECIFIED: Chronic | Status: ACTIVE | Noted: 2025-05-08

## 2025-05-22 PROBLEM — Z86.69 PERSONAL HISTORY OF OTHER DISEASES OF THE NERVOUS SYSTEM AND SENSE ORGANS: Chronic | Status: ACTIVE | Noted: 2025-05-08

## 2025-05-27 ENCOUNTER — NON-APPOINTMENT (OUTPATIENT)
Age: 75
End: 2025-05-27

## 2025-05-27 DIAGNOSIS — R82.71 BACTERIURIA: ICD-10-CM

## 2025-05-27 RX ORDER — CEFPODOXIME PROXETIL 200 MG/1
200 TABLET, FILM COATED ORAL
Qty: 14 | Refills: 0 | Status: ACTIVE | COMMUNITY
Start: 2025-05-27 | End: 1900-01-01

## 2025-06-02 ENCOUNTER — APPOINTMENT (OUTPATIENT)
Dept: UROLOGY | Facility: HOSPITAL | Age: 75
End: 2025-06-02

## 2025-06-02 ENCOUNTER — TRANSCRIPTION ENCOUNTER (OUTPATIENT)
Age: 75
End: 2025-06-02

## 2025-06-02 ENCOUNTER — RESULT REVIEW (OUTPATIENT)
Age: 75
End: 2025-06-02

## 2025-06-02 ENCOUNTER — OUTPATIENT (OUTPATIENT)
Dept: OUTPATIENT SERVICES | Facility: HOSPITAL | Age: 75
LOS: 1 days | End: 2025-06-02
Payer: MEDICARE

## 2025-06-02 VITALS
SYSTOLIC BLOOD PRESSURE: 135 MMHG | HEART RATE: 63 BPM | OXYGEN SATURATION: 97 % | WEIGHT: 182.98 LBS | RESPIRATION RATE: 20 BRPM | HEIGHT: 60.98 IN | TEMPERATURE: 97 F | DIASTOLIC BLOOD PRESSURE: 54 MMHG

## 2025-06-02 VITALS
OXYGEN SATURATION: 99 % | DIASTOLIC BLOOD PRESSURE: 79 MMHG | TEMPERATURE: 97 F | SYSTOLIC BLOOD PRESSURE: 150 MMHG | RESPIRATION RATE: 16 BRPM | HEART RATE: 92 BPM

## 2025-06-02 DIAGNOSIS — Z98.890 OTHER SPECIFIED POSTPROCEDURAL STATES: Chronic | ICD-10-CM

## 2025-06-02 DIAGNOSIS — Z96.641 PRESENCE OF RIGHT ARTIFICIAL HIP JOINT: Chronic | ICD-10-CM

## 2025-06-02 DIAGNOSIS — N39.0 URINARY TRACT INFECTION, SITE NOT SPECIFIED: ICD-10-CM

## 2025-06-02 DIAGNOSIS — Z85.820 PERSONAL HISTORY OF MALIGNANT MELANOMA OF SKIN: Chronic | ICD-10-CM

## 2025-06-02 DIAGNOSIS — Z98.1 ARTHRODESIS STATUS: Chronic | ICD-10-CM

## 2025-06-02 DIAGNOSIS — R33.9 RETENTION OF URINE, UNSPECIFIED: ICD-10-CM

## 2025-06-02 DIAGNOSIS — Z96.653 PRESENCE OF ARTIFICIAL KNEE JOINT, BILATERAL: Chronic | ICD-10-CM

## 2025-06-02 DIAGNOSIS — N81.10 CYSTOCELE, UNSPECIFIED: Chronic | ICD-10-CM

## 2025-06-02 LAB
ANION GAP SERPL CALC-SCNC: 12 MMOL/L — SIGNIFICANT CHANGE UP (ref 5–17)
BUN SERPL-MCNC: 22 MG/DL — SIGNIFICANT CHANGE UP (ref 7–23)
CALCIUM SERPL-MCNC: 8.2 MG/DL — LOW (ref 8.4–10.5)
CHLORIDE SERPL-SCNC: 104 MMOL/L — SIGNIFICANT CHANGE UP (ref 96–108)
CO2 SERPL-SCNC: 23 MMOL/L — SIGNIFICANT CHANGE UP (ref 22–31)
CREAT SERPL-MCNC: 0.89 MG/DL — SIGNIFICANT CHANGE UP (ref 0.5–1.3)
EGFR: 69 ML/MIN/1.73M2 — SIGNIFICANT CHANGE UP
EGFR: 69 ML/MIN/1.73M2 — SIGNIFICANT CHANGE UP
GLUCOSE SERPL-MCNC: 115 MG/DL — HIGH (ref 70–99)
HCT VFR BLD CALC: 32.1 % — LOW (ref 34.5–45)
HGB BLD-MCNC: 10.3 G/DL — LOW (ref 11.5–15.5)
MCHC RBC-ENTMCNC: 30.8 PG — SIGNIFICANT CHANGE UP (ref 27–34)
MCHC RBC-ENTMCNC: 32.1 G/DL — SIGNIFICANT CHANGE UP (ref 32–36)
MCV RBC AUTO: 96.1 FL — SIGNIFICANT CHANGE UP (ref 80–100)
NRBC BLD AUTO-RTO: 0 /100 WBCS — SIGNIFICANT CHANGE UP (ref 0–0)
PLATELET # BLD AUTO: 192 K/UL — SIGNIFICANT CHANGE UP (ref 150–400)
POTASSIUM SERPL-MCNC: 4.6 MMOL/L — SIGNIFICANT CHANGE UP (ref 3.5–5.3)
POTASSIUM SERPL-SCNC: 4.6 MMOL/L — SIGNIFICANT CHANGE UP (ref 3.5–5.3)
RBC # BLD: 3.34 M/UL — LOW (ref 3.8–5.2)
RBC # FLD: 13.1 % — SIGNIFICANT CHANGE UP (ref 10.3–14.5)
SODIUM SERPL-SCNC: 139 MMOL/L — SIGNIFICANT CHANGE UP (ref 135–145)
WBC # BLD: 5.28 K/UL — SIGNIFICANT CHANGE UP (ref 3.8–10.5)
WBC # FLD AUTO: 5.28 K/UL — SIGNIFICANT CHANGE UP (ref 3.8–10.5)

## 2025-06-02 PROCEDURE — 87086 URINE CULTURE/COLONY COUNT: CPT

## 2025-06-02 PROCEDURE — 80048 BASIC METABOLIC PNL TOTAL CA: CPT

## 2025-06-02 PROCEDURE — 57287 REVISE/REMOVE SLING REPAIR: CPT

## 2025-06-02 PROCEDURE — 87206 SMEAR FLUORESCENT/ACID STAI: CPT

## 2025-06-02 PROCEDURE — 56441 LYSIS OF LABIAL ADHESIONS: CPT

## 2025-06-02 PROCEDURE — 85027 COMPLETE CBC AUTOMATED: CPT

## 2025-06-02 PROCEDURE — 88300 SURGICAL PATH GROSS: CPT

## 2025-06-02 PROCEDURE — 57250 REPAIR RECTUM & VAGINA: CPT

## 2025-06-02 PROCEDURE — 87116 MYCOBACTERIA CULTURE: CPT

## 2025-06-02 PROCEDURE — 87015 SPECIMEN INFECT AGNT CONCNTJ: CPT

## 2025-06-02 PROCEDURE — 86901 BLOOD TYPING SEROLOGIC RH(D): CPT

## 2025-06-02 PROCEDURE — 88300 SURGICAL PATH GROSS: CPT | Mod: 26

## 2025-06-02 PROCEDURE — 86900 BLOOD TYPING SEROLOGIC ABO: CPT

## 2025-06-02 PROCEDURE — 57265 CMBN AP COLPRHY W/NTRCL RPR: CPT

## 2025-06-02 PROCEDURE — 46750 REPAIR OF ANAL SPHINCTER: CPT

## 2025-06-02 PROCEDURE — 86922 COMPATIBILITY TEST ANTIGLOB: CPT

## 2025-06-02 PROCEDURE — 86850 RBC ANTIBODY SCREEN: CPT

## 2025-06-02 RX ORDER — CEFAZOLIN SODIUM IN 0.9 % NACL 3 G/100 ML
2000 INTRAVENOUS SOLUTION, PIGGYBACK (ML) INTRAVENOUS ONCE
Refills: 0 | Status: COMPLETED | OUTPATIENT
Start: 2025-06-02 | End: 2025-06-02

## 2025-06-02 RX ORDER — SODIUM CHLORIDE 9 G/1000ML
1000 INJECTION, SOLUTION INTRAVENOUS
Refills: 0 | Status: DISCONTINUED | OUTPATIENT
Start: 2025-06-02 | End: 2025-06-02

## 2025-06-02 RX ORDER — HYDROMORPHONE/SOD CHLOR,ISO/PF 2 MG/10 ML
0.5 SYRINGE (ML) INJECTION
Refills: 0 | Status: DISCONTINUED | OUTPATIENT
Start: 2025-06-02 | End: 2025-06-02

## 2025-06-02 RX ORDER — FENTANYL CITRATE-0.9 % NACL/PF 100MCG/2ML
50 SYRINGE (ML) INTRAVENOUS
Refills: 0 | Status: DISCONTINUED | OUTPATIENT
Start: 2025-06-02 | End: 2025-06-02

## 2025-06-02 RX ORDER — OXYCODONE HYDROCHLORIDE 30 MG/1
5 TABLET ORAL ONCE
Refills: 0 | Status: DISCONTINUED | OUTPATIENT
Start: 2025-06-02 | End: 2025-06-02

## 2025-06-02 RX ORDER — POLYETHYLENE GLYCOL 3350 17 G/17G
17 POWDER, FOR SOLUTION ORAL
Qty: 1 | Refills: 0
Start: 2025-06-02 | End: 2025-06-15

## 2025-06-02 RX ORDER — ONDANSETRON HCL/PF 4 MG/2 ML
4 VIAL (ML) INJECTION ONCE
Refills: 0 | Status: DISCONTINUED | OUTPATIENT
Start: 2025-06-02 | End: 2025-06-02

## 2025-06-02 RX ORDER — LIDOCAINE HCL/PF 10 MG/ML
0.2 VIAL (ML) INJECTION ONCE
Refills: 0 | Status: DISCONTINUED | OUTPATIENT
Start: 2025-06-02 | End: 2025-06-02

## 2025-06-02 NOTE — ASU DISCHARGE PLAN (ADULT/PEDIATRIC) - ASU DC SPECIAL INSTRUCTIONSFT
Most patients are sent home with a catheter, usually after receiving instructions on how to “cycle” your tube.    It is common to have blood in the urine after your procedure.  It may be pink or even red; inform your doctor if you have a significant amount of clots in the urine or if you are unable to void at all.  Keep yourself well hydrated at all times.    -Your incision has already started the healing process.  If you notice excessive drainage of liquid or blood from your incision, inform your doctor.    -Drink at least 6-8 glasses of fluid per day; minimize night-time drinking if this causes you to awaken regularly to urinate  -You may resume your regular diet and regular medication regimen.    -You may shower in 24-48 hours.  -You may experience weakness after you are discharged from the hospital.  This is normal.  You will slowly regain your strength as time goes by.  -You may walk around and go up and down stairs.  -Do not exercise, lift heavy objects, return to work, or resume sexual activity until you are told to do so by your urologist.      -Please take colace and miralax as prescribed.   -Please finish taking the antibiotic prescribed to you preoperatively, take these medications as instructed; if you miss one dose, resume taking them on your previous schedule until you have completed the entire course of treatment.  -Call your physician if you have a fever over 101F.  -Call your physician if your suprapubic tube is accidentally pulled out.  -Make a follow up appointment with your urologist when you arrive home (or the next business day).  -Call your urologist during normal business hours with any other routine questions.

## 2025-06-02 NOTE — ASU DISCHARGE PLAN (ADULT/PEDIATRIC) - CARE PROVIDER_API CALL
Michelle Jaramillo  Urology  [address] [place] [address], [address] [address]  Phone: [phone]  Fax: [phone]  Follow Up Time: Routine

## 2025-06-02 NOTE — PRE-ANESTHESIA EVALUATION ADULT - NSANTHPMHFT_GEN_ALL_CORE
75yo F history of recurrent UTIs and urge incontinence presenting for a revision cystoscopy with urethral sling and rectocele repair

## 2025-06-02 NOTE — ASU DISCHARGE PLAN (ADULT/PEDIATRIC) - FINANCIAL ASSISTANCE
[facility] provides services at a reduced cost to those who are determined to be eligible through [facility]’s financial assistance program. Information regarding [facility]’s financial assistance program can be found by going to [email or web address] or by calling [phone].

## 2025-06-03 LAB
NIGHT BLUE STAIN TISS: SIGNIFICANT CHANGE UP
SPECIMEN SOURCE: SIGNIFICANT CHANGE UP

## 2025-06-04 LAB
CULTURE RESULTS: NO GROWTH — SIGNIFICANT CHANGE UP
SPECIMEN SOURCE: SIGNIFICANT CHANGE UP

## 2025-06-05 LAB — SURGICAL PATHOLOGY STUDY: SIGNIFICANT CHANGE UP

## 2025-06-11 ENCOUNTER — APPOINTMENT (OUTPATIENT)
Dept: UROLOGY | Facility: CLINIC | Age: 75
End: 2025-06-11

## 2025-06-13 ENCOUNTER — APPOINTMENT (OUTPATIENT)
Dept: UROLOGY | Facility: CLINIC | Age: 75
End: 2025-06-13
Payer: MEDICARE

## 2025-06-13 ENCOUNTER — OUTPATIENT (OUTPATIENT)
Dept: OUTPATIENT SERVICES | Facility: HOSPITAL | Age: 75
LOS: 1 days | End: 2025-06-13
Payer: MEDICARE

## 2025-06-13 DIAGNOSIS — N81.10 CYSTOCELE, UNSPECIFIED: Chronic | ICD-10-CM

## 2025-06-13 DIAGNOSIS — Z98.1 ARTHRODESIS STATUS: Chronic | ICD-10-CM

## 2025-06-13 DIAGNOSIS — Z96.653 PRESENCE OF ARTIFICIAL KNEE JOINT, BILATERAL: Chronic | ICD-10-CM

## 2025-06-13 DIAGNOSIS — Z85.820 PERSONAL HISTORY OF MALIGNANT MELANOMA OF SKIN: Chronic | ICD-10-CM

## 2025-06-13 DIAGNOSIS — Z98.890 OTHER SPECIFIED POSTPROCEDURAL STATES: Chronic | ICD-10-CM

## 2025-06-13 DIAGNOSIS — R35.0 FREQUENCY OF MICTURITION: ICD-10-CM

## 2025-06-13 DIAGNOSIS — Z96.641 PRESENCE OF RIGHT ARTIFICIAL HIP JOINT: Chronic | ICD-10-CM

## 2025-06-13 PROBLEM — N39.3 STRESS INCONTINENCE, FEMALE: Status: ACTIVE | Noted: 2025-06-13

## 2025-06-13 PROCEDURE — 51700 IRRIGATION OF BLADDER: CPT

## 2025-06-13 PROCEDURE — 99213 OFFICE O/P EST LOW 20 MIN: CPT | Mod: 25

## 2025-06-17 DIAGNOSIS — R33.9 RETENTION OF URINE, UNSPECIFIED: ICD-10-CM

## 2025-06-17 DIAGNOSIS — N39.3 STRESS INCONTINENCE (FEMALE) (MALE): ICD-10-CM

## 2025-06-25 ENCOUNTER — APPOINTMENT (OUTPATIENT)
Dept: UROLOGY | Facility: CLINIC | Age: 75
End: 2025-06-25
Payer: MEDICARE

## 2025-06-25 VITALS
HEART RATE: 69 BPM | WEIGHT: 185 LBS | BODY MASS INDEX: 34.93 KG/M2 | HEIGHT: 61 IN | SYSTOLIC BLOOD PRESSURE: 105 MMHG | DIASTOLIC BLOOD PRESSURE: 68 MMHG | RESPIRATION RATE: 16 BRPM

## 2025-06-25 PROBLEM — R15.9 FECAL INCONTINENCE: Status: ACTIVE | Noted: 2025-06-25

## 2025-06-25 PROCEDURE — 99459 PELVIC EXAMINATION: CPT

## 2025-06-25 PROCEDURE — 99214 OFFICE O/P EST MOD 30 MIN: CPT | Mod: 25

## 2025-06-25 PROCEDURE — 51798 US URINE CAPACITY MEASURE: CPT

## 2025-06-25 RX ORDER — PSYLLIUM SEED (WITH DEXTROSE)
25 POWDER (GRAM) ORAL DAILY
Qty: 1 | Refills: 3 | Status: ACTIVE | COMMUNITY
Start: 2025-06-25 | End: 1900-01-01

## 2025-06-25 RX ORDER — VIBEGRON 75 MG/1
75 TABLET, FILM COATED ORAL
Qty: 90 | Refills: 3 | Status: ACTIVE | COMMUNITY
Start: 2025-06-25 | End: 1900-01-01

## 2025-06-26 LAB
APPEARANCE: CLEAR
BACTERIA: ABNORMAL /HPF
BILIRUBIN URINE: NEGATIVE
BLOOD URINE: NEGATIVE
CAST: 1 /LPF
COLOR: NORMAL
EPITHELIAL CELLS: 3 /HPF
GLUCOSE QUALITATIVE U: NEGATIVE MG/DL
KETONES URINE: NEGATIVE MG/DL
LEUKOCYTE ESTERASE URINE: ABNORMAL
MICROSCOPIC-UA: NORMAL
NITRITE URINE: POSITIVE
PH URINE: 5.5
PROTEIN URINE: NEGATIVE MG/DL
RED BLOOD CELLS URINE: 0 /HPF
SPECIFIC GRAVITY URINE: 1.02
UROBILINOGEN URINE: 0.2 MG/DL
WHITE BLOOD CELLS URINE: 38 /HPF

## 2025-06-29 LAB — BACTERIA UR CULT: ABNORMAL

## 2025-07-23 ENCOUNTER — APPOINTMENT (OUTPATIENT)
Dept: UROLOGY | Facility: CLINIC | Age: 75
End: 2025-07-23

## 2025-08-27 ENCOUNTER — NON-APPOINTMENT (OUTPATIENT)
Age: 75
End: 2025-08-27

## 2025-08-27 ENCOUNTER — APPOINTMENT (OUTPATIENT)
Dept: UROLOGY | Facility: CLINIC | Age: 75
End: 2025-08-27
Payer: MEDICARE

## 2025-08-27 VITALS — DIASTOLIC BLOOD PRESSURE: 82 MMHG | HEART RATE: 61 BPM | SYSTOLIC BLOOD PRESSURE: 132 MMHG

## 2025-08-27 PROCEDURE — 99459 PELVIC EXAMINATION: CPT

## 2025-08-27 PROCEDURE — 99214 OFFICE O/P EST MOD 30 MIN: CPT | Mod: 25

## 2025-08-27 PROCEDURE — 51798 US URINE CAPACITY MEASURE: CPT

## 2025-08-28 LAB
APPEARANCE: CLEAR
BACTERIA UR CULT: NORMAL
BACTERIA: NEGATIVE /HPF
BILIRUBIN URINE: NEGATIVE
BLOOD URINE: NEGATIVE
CAST: 0 /LPF
COLOR: YELLOW
EPITHELIAL CELLS: 0 /HPF
GLUCOSE QUALITATIVE U: NEGATIVE MG/DL
KETONES URINE: NEGATIVE MG/DL
LEUKOCYTE ESTERASE URINE: NEGATIVE
MICROSCOPIC-UA: NORMAL
NITRITE URINE: NEGATIVE
PH URINE: 6.5
PROTEIN URINE: NEGATIVE MG/DL
RED BLOOD CELLS URINE: 0 /HPF
SPECIFIC GRAVITY URINE: 1.01
UROBILINOGEN URINE: 0.2 MG/DL
WHITE BLOOD CELLS URINE: 0 /HPF

## (undated) DEVICE — NDL HYPO REGULAR BEVEL 22G X 1.5" (TURQUOISE)

## (undated) DEVICE — SOL IRR BAG NS 0.9% 3000ML

## (undated) DEVICE — SUT PDS II 2-0 27" SH

## (undated) DEVICE — SPECIMEN CONTAINER 100ML

## (undated) DEVICE — SYR LUER LOK 20CC

## (undated) DEVICE — LUBRICATING JELLY ONESHOT 1.25OZ

## (undated) DEVICE — PACK GYN LAPAROSCOPY

## (undated) DEVICE — DRAPE LIGHT HANDLE COVER (BLUE)

## (undated) DEVICE — DRAPE INSTRUMENT POUCH 6.75" X 11"

## (undated) DEVICE — VENODYNE/SCD SLEEVE CALF MEDIUM

## (undated) DEVICE — DRSG DERMABOND 0.7ML

## (undated) DEVICE — DRAPE TOWEL BLUE 17" X 24"

## (undated) DEVICE — PREP BETADINE KIT

## (undated) DEVICE — FOLEY CATH 2-WAY 18FR 5CC LATEX HYDROGEL

## (undated) DEVICE — TUBING TUR 2 PRONG

## (undated) DEVICE — POSITIONER FOAM HEADREST (PINK)

## (undated) DEVICE — TUBING SUCTION 20FT

## (undated) DEVICE — ELCTR BOVIE PENCIL SMOKE EVACUATION

## (undated) DEVICE — ELCTR GROUNDING PAD ADULT COVIDIEN

## (undated) DEVICE — FOLEY TRAY 16FR 5CC LTX UMETER CLOSED

## (undated) DEVICE — MEDICATION LABELS W MARKER

## (undated) DEVICE — DRSG KLING 4"

## (undated) DEVICE — FOLEY CATH 2-WAY 16FR 5CC LATEX LUBRICATH

## (undated) DEVICE — GLV 7.5 PROTEXIS (WHITE)

## (undated) DEVICE — SUT POLYSORB 2-0 18" V-20

## (undated) DEVICE — SOL IRR POUR H2O 250ML

## (undated) DEVICE — SOL IRR POUR NS 0.9% 500ML

## (undated) DEVICE — POSITIONER FOAM EGG CRATE ULNAR 2PCS (PINK)

## (undated) DEVICE — DRAPE MAYO STAND 30"

## (undated) DEVICE — WARMING BLANKET UPPER ADULT

## (undated) DEVICE — LONE STAR ELASTIC STAY HOOK 12MM BLUNT

## (undated) DEVICE — LONE STAR ELASTIC STAY HOOK 5MM SHARP

## (undated) DEVICE — SUCTION YANKAUER NO CONTROL VENT

## (undated) DEVICE — LONE STAR RETRACTOR RING 32.5CM X 18.3CM DISP